# Patient Record
Sex: MALE | Race: WHITE | Employment: OTHER | URBAN - METROPOLITAN AREA
[De-identification: names, ages, dates, MRNs, and addresses within clinical notes are randomized per-mention and may not be internally consistent; named-entity substitution may affect disease eponyms.]

---

## 2022-01-18 NOTE — PROGRESS NOTES
Chief Complaint   Patient presents with    New Patient    Leg Pain       HPI:    Qian Olsen is a 73 y.o. male who present to [facility] care. He is a retired  who moved to the area about 7 months ago. HTN:  Per patient report, this has been elevated for \"years\" but he has never been treated. DM:  Previously on 3 different medications, but has been off of these for over a year. Sugars at home have been reading 280-300. HLD:  Severe episode of rhabdo after starting a statin medication; now controlled with diet. Bipolar d/o:  Treated at local [facility]; stable on Seroquel. New issues:  Concerned about the cost of medical care; stopped DM meds due to cost and inquires if his Seroquel can be managed here instead of at [facility]. Not on File    Current Outpatient Medications   Medication Sig    lisinopriL (PRINIVIL, ZESTRIL) 10 mg tablet Take 1 Tablet by mouth daily. No current facility-administered medications for this visit. Past Medical History:   Diagnosis Date    Diabetes ([facility] [address]        Family History   Problem Relation Age of Onset    Diabetes Father        Review of Systems   Constitutional: Negative. Negative for chills, fever and malaise/fatigue. HENT: Positive for hearing loss. Eyes: Negative. Respiratory: Negative. Negative for cough and shortness of breath. Cardiovascular: Negative. Negative for chest pain, palpitations and leg swelling. Gastrointestinal: Negative. Negative for abdominal pain, nausea and vomiting. Genitourinary: Negative. Musculoskeletal: Negative. Skin: Negative. Neurological: Negative. Negative for dizziness and headaches. Endo/Heme/Allergies: Negative. Psychiatric/Behavioral: Negative. Negative for depression. The patient is not nervous/anxious.          BP (!) 174/88   Pulse 80   Temp 97.5 °F (36.4 °C) (Temporal)   Resp 20   Ht 6' 3\" (1.905 m)   Wt 207 lb 6.4 oz (94.1 kg)   SpO2 97%   BMI 25.92 kg/m²     Wt Readings from Last 3 Encounters:   01/21/22 207 lb 6.4 oz (94.1 kg)       Physical Exam  Vitals and nursing note reviewed. Constitutional:       General: He is not in acute distress. Appearance: Normal appearance. HENT:      Head: Normocephalic and atraumatic. Mouth/Throat:      Mouth: Mucous membranes are moist.      Pharynx: Oropharynx is clear. Eyes:      Extraocular Movements: Extraocular movements intact. Conjunctiva/sclera: Conjunctivae normal.      Pupils: Pupils are equal, round, and reactive to light. Neck:      Vascular: No carotid bruit. Cardiovascular:      Rate and Rhythm: Normal rate and regular rhythm. Pulses: Normal pulses. Heart sounds: Normal heart sounds. No murmur heard. No friction rub. No gallop. Pulmonary:      Effort: Pulmonary effort is normal.      Breath sounds: Normal breath sounds. No wheezing, rhonchi or rales. Abdominal:      General: Bowel sounds are normal.      Palpations: Abdomen is soft. Musculoskeletal:         General: Normal range of motion. Cervical back: Normal range of motion and neck supple. Lymphadenopathy:      Cervical: No cervical adenopathy. Skin:     General: Skin is warm and dry. Neurological:      General: No focal deficit present. Mental Status: He is alert and oriented to person, place, and time. Psychiatric:         Mood and Affect: Mood normal.         Behavior: Behavior normal.         Thought Content: Thought content normal.         Judgment: Judgment normal.       ASSESSMENT AND PLAN:       ICD-10-CM ICD-9-CM    1. Medicare annual wellness visit, subsequent  Z00.00 V70.0    2. Primary hypertension  I10 401.9 COLLECTION VENOUS BLOOD,VENIPUNCTURE      LIPID PANEL      LIPID PANEL      lisinopriL (PRINIVIL, ZESTRIL) 10 mg tablet   3.  Type 2 diabetes mellitus with hyperglycemia, without long-term current use of insulin ([facility])  E11.65 250.00 COLLECTION VENOUS BLOOD,VENIPUNCTURE     790.29 CBC WITH AUTOMATED DIFF METABOLIC PANEL, COMPREHENSIVE      LIPID PANEL      LIPID PANEL      METABOLIC PANEL, COMPREHENSIVE      CBC WITH AUTOMATED DIFF   4. Vitamin D deficiency  E55.9 268.9 COLLECTION VENOUS BLOOD,VENIPUNCTURE      VITAMIN D, 25 HYDROXY      VITAMIN D, 25 HYDROXY   5. Screening for viral disease  Z11.59 V73.99 COLLECTION VENOUS BLOOD,VENIPUNCTURE      HEPATITIS C AB      HEPATITIS C AB   6. Prostate cancer screening  Z12.5 V76.44 COLLECTION VENOUS BLOOD,VENIPUNCTURE      PSA SCREENING (SCREENING)      PSA SCREENING (SCREENING)     Check labs today. Obtain records from previous PCP and mental health provider. Last colonoscopy was about 3 years ago and he is due again in March due to presence of polyp; will obtain these records. Is still considering COVID vaccine; thinks he'll probably get it. Declines flu, Shingrix, and PPSV vaccines. Based on today's BP reading and his report of previous elevation, will initiate lisinopril. Will await other labs to initiate DM treatment. Medication Side Effects and Warnings were discussed with patient:  yes  Patient Labs were reviewed:  yes  Patient Past Records were reviewed:  yes      Patient aware of plan of care and verbalized understanding. Questions answered. RTC 3 months or sooner if needed. On this date 01/21/2022 I have spent 45 minutes reviewing previous notes, test results and face to face with the patient discussing the diagnosis and importance of compliance with the treatment plan as well as documenting on the day of the visit.     Jose Luis Diaz NP

## 2022-01-21 ENCOUNTER — OFFICE VISIT (OUTPATIENT)
Dept: FAMILY MEDICINE CLINIC | Age: 72
End: 2022-01-21
Payer: MEDICARE

## 2022-01-21 ENCOUNTER — TELEPHONE (OUTPATIENT)
Dept: FAMILY MEDICINE CLINIC | Age: 72
End: 2022-01-21

## 2022-01-21 VITALS
WEIGHT: 207.4 LBS | BODY MASS INDEX: 25.79 KG/M2 | TEMPERATURE: 97.5 F | SYSTOLIC BLOOD PRESSURE: 174 MMHG | RESPIRATION RATE: 20 BRPM | HEART RATE: 80 BPM | HEIGHT: 75 IN | DIASTOLIC BLOOD PRESSURE: 88 MMHG | OXYGEN SATURATION: 97 %

## 2022-01-21 DIAGNOSIS — I10 PRIMARY HYPERTENSION: ICD-10-CM

## 2022-01-21 DIAGNOSIS — E55.9 VITAMIN D DEFICIENCY: ICD-10-CM

## 2022-01-21 DIAGNOSIS — Z00.00 MEDICARE ANNUAL WELLNESS VISIT, SUBSEQUENT: Primary | ICD-10-CM

## 2022-01-21 DIAGNOSIS — Z11.59 SCREENING FOR VIRAL DISEASE: ICD-10-CM

## 2022-01-21 DIAGNOSIS — E11.65 TYPE 2 DIABETES MELLITUS WITH HYPERGLYCEMIA, WITHOUT LONG-TERM CURRENT USE OF INSULIN (HCC): ICD-10-CM

## 2022-01-21 DIAGNOSIS — Z12.5 PROSTATE CANCER SCREENING: ICD-10-CM

## 2022-01-21 PROCEDURE — G0439 PPPS, SUBSEQ VISIT: HCPCS

## 2022-01-21 PROCEDURE — 99204 OFFICE O/P NEW MOD 45 MIN: CPT

## 2022-01-21 RX ORDER — LISINOPRIL 10 MG/1
10 TABLET ORAL DAILY
Qty: 30 TABLET | Refills: 2 | Status: SHIPPED | OUTPATIENT
Start: 2022-01-21 | End: 2022-04-22

## 2022-01-21 NOTE — ACP (ADVANCE CARE PLANNING)
Discussed importance of advanced medical directives with patient. Patient is capable of making decisions. Discussed advanced directives 1/21/2022. [place] advance directive form discussed with pt. Pt will consider options and give us written form back for inclusion in chart.

## 2022-01-21 NOTE — PATIENT INSTRUCTIONS
Medicare Wellness Visit, Male    The best way to live healthy is to have a lifestyle where you eat a well-balanced diet, exercise regularly, limit alcohol use, and quit all forms of tobacco/nicotine, if applicable. Regular preventive services are another way to keep healthy. Preventive services (vaccines, screening tests, monitoring & exams) can help personalize your care plan, which helps you manage your own care. Screening tests can find health problems at the earliest stages, when they are easiest to treat. [facility] follows the current, evidence-based guidelines published by the [facility] ([facility]) when recommending preventive services for our patients. Because we follow these guidelines, sometimes recommendations change over time as research supports it. (For example, a prostate screening blood test is no longer routinely recommended for men with no symptoms). Of course, you and your doctor may decide to screen more often for some diseases, based on your risk and co-morbidities (chronic disease you are already diagnosed with). Preventive services for you include:  - Medicare offers their members a free annual wellness visit, which is time for you and your primary care provider to discuss and plan for your preventive service needs. Take advantage of this benefit every year!  -All adults over age 72 should receive the recommended pneumonia vaccines. Current USPSTF guidelines recommend a series of two vaccines for the best pneumonia protection.   -All adults should have a flu vaccine yearly and tetanus vaccine every 10 years.  -All adults age 48 and older should receive the shingles vaccines (series of two vaccines).        -All adults age 38-68 who are overweight should have a diabetes screening test once every three years.   -Other screening tests & preventive services for persons with diabetes include: an eye exam to screen for diabetic retinopathy, a kidney function test, a foot exam, and stricter control over your cholesterol.   -Cardiovascular screening for adults with routine risk involves an electrocardiogram (ECG) at intervals determined by the provider.   -Colorectal cancer screening should be done for adults age 54-65 with no increased risk factors for colorectal cancer. There are a number of acceptable methods of screening for this type of cancer. Each test has its own benefits and drawbacks. Discuss with your provider what is most appropriate for you during your annual wellness visit. The different tests include: colonoscopy (considered the best screening method), a fecal occult blood test, a fecal DNA test, and sigmoidoscopy.  -All adults born between [facility] should be screened once for Hepatitis C.  -An Abdominal Aortic Aneurysm (AAA) Screening is recommended for men age 73-68 who has ever smoked in their lifetime. Here is a list of your current Health Maintenance items (your personalized list of preventive services) with a due date:  Health Maintenance Due   Topic Date Due    Hepatitis C Test  Never done    COVID-19 Vaccine (1) Never done    DTaP/Tdap/Td  (1 - Tdap) Never done    Cholesterol Test   Never done    Colorectal Screening  Never done    Shingles Vaccine (1 of 2) Never done    Pneumococcal Vaccine (1 of 1 - PPSV23) Never done    Yearly Flu Vaccine (1) Never done    Annual Well Visit  01/20/2022     Medicare Wellness Visit, Male    The best way to live healthy is to have a lifestyle where you eat a well-balanced diet, exercise regularly, limit alcohol use, and quit all forms of tobacco/nicotine, if applicable. Regular preventive services are another way to keep healthy. Preventive services (vaccines, screening tests, monitoring & exams) can help personalize your care plan, which helps you manage your own care.  Screening tests can find health problems at the earliest stages, when they are easiest to treatIker Villa follows the current, evidence-based guidelines published by the [facility] ([facility]) when recommending preventive services for our patients. Because we follow these guidelines, sometimes recommendations change over time as research supports it. (For example, a prostate screening blood test is no longer routinely recommended for men with no symptoms). Of course, you and your doctor may decide to screen more often for some diseases, based on your risk and co-morbidities (chronic disease you are already diagnosed with). Preventive services for you include:  - Medicare offers their members a free annual wellness visit, which is time for you and your primary care provider to discuss and plan for your preventive service needs. Take advantage of this benefit every year!  -All adults over age 72 should receive the recommended pneumonia vaccines. Current USPSTF guidelines recommend a series of two vaccines for the best pneumonia protection.   -All adults should have a flu vaccine yearly and tetanus vaccine every 10 years.  -All adults age 48 and older should receive the shingles vaccines (series of two vaccines). -All adults age 38-68 who are overweight should have a diabetes screening test once every three years.   -Other screening tests & preventive services for persons with diabetes include: an eye exam to screen for diabetic retinopathy, a kidney function test, a foot exam, and stricter control over your cholesterol.   -Cardiovascular screening for adults with routine risk involves an electrocardiogram (ECG) at intervals determined by the provider.   -Colorectal cancer screening should be done for adults age 54-65 with no increased risk factors for colorectal cancer. There are a number of acceptable methods of screening for this type of cancer. Each test has its own benefits and drawbacks.  Discuss with your provider what is most appropriate for you during your annual wellness visit. The different tests include: colonoscopy (considered the best screening method), a fecal occult blood test, a fecal DNA test, and sigmoidoscopy.  -All adults born between [facility] should be screened once for Hepatitis C.  -An Abdominal Aortic Aneurysm (AAA) Screening is recommended for men age 73-68 who has ever smoked in their lifetime. Here is a list of your current Health Maintenance items (your personalized list of preventive services) with a due date:  Health Maintenance Due   Topic Date Due    Hepatitis C Test  Never done    COVID-19 Vaccine (1) Never done    DTaP/Tdap/Td  (1 - Tdap) Never done    Cholesterol Test   Never done    Colorectal Screening  Never done    Shingles Vaccine (1 of 2) Never done    Pneumococcal Vaccine (1 of 1 - PPSV23) Never done    Yearly Flu Vaccine (1) Never done        Learning About Meal Planning for Diabetes  Why plan your meals? Meal planning can be a key part of managing diabetes. Planning meals and snacks with the right balance of carbohydrate, protein, and fat can help you keep your blood sugar at the target level you set with your doctor. You don't have to eat special foods. You can eat what your family eats, including sweets once in a while. But you do have to pay attention to how often you eat and how much you eat of certain foods. You may want to work with a dietitian or a certified diabetes educator. He or she can give you tips and meal ideas and can answer your questions about meal planning. This health professional can also help you reach a healthy weight if that is one of your goals. What plan is right for you? Your dietitian or diabetes educator may suggest that you start with the plate format or carbohydrate counting. The plate format  The plate format is a simple way to help you manage how you eat. You plan meals by learning how much space each food should take on a plate.  Using the plate format helps you spread carbohydrate throughout the day. It can make it easier to keep your blood sugar level within your target range. It also helps you see if you're eating healthy portion sizes. To use the plate format, you put non-starchy vegetables on half your plate. Add meat or meat substitutes on one-quarter of the plate. Put a grain or starchy vegetable (such as brown rice or a potato) on the final quarter of the plate. You can add a small piece of fruit and some low-fat or fat-free milk or yogurt, depending on your carbohydrate goal for each meal.  Here are some tips for using the plate format:  · Make sure that you are not using an oversized plate. A 9-inch plate is best. Many restaurants use larger plates. · Get used to using the plate format at home. Then you can use it when you eat out. · Write down your questions about using the plate format. Talk to your doctor, a dietitian, or a diabetes educator about your concerns. Carbohydrate counting  With carbohydrate counting, you plan meals based on the amount of carbohydrate in each food. Carbohydrate raises blood sugar higher and more quickly than any other nutrient. It is found in desserts, breads and cereals, and fruit. It's also found in starchy vegetables such as potatoes and corn, grains such as rice and pasta, and milk and yogurt. Spreading carbohydrate throughout the day helps keep your blood sugar levels within your target range. Your daily amount depends on several things, including your weight, how active you are, which diabetes medicines you take, and what your goals are for your blood sugar levels. A registered dietitian or diabetes educator can help you plan how much carbohydrate to include in each meal and snack. A guideline for your daily amount of carbohydrate is:  · 45 to 60 grams at each meal. That's about the same as 3 to 4 carbohydrate servings. · 15 to 20 grams at each snack.  That's about the same as 1 carbohydrate serving. The Nutrition Facts label on packaged foods tells you how much carbohydrate is in a serving of the food. First, look at the serving size on the food label. Is that the amount you eat in a serving? All of the nutrition information on a food label is based on that serving size. So if you eat more or less than that, you'll need to adjust the other numbers. Total carbohydrate is the next thing you need to look for on the label. If you count carbohydrate servings, one serving of carbohydrate is 15 grams. For foods that don't come with labels, such as fresh fruits and vegetables, you'll need a guide that lists carbohydrate in these foods. Ask your doctor, dietitian, or diabetes educator about books or other nutrition guides you can use. If you take insulin, you need to know how many grams of carbohydrate are in a meal. This lets you know how much rapid-acting insulin to take before you eat. If you use an insulin pump, you get a constant rate of insulin during the day. So the pump must be programmed at meals to give you extra insulin to cover the rise in blood sugar after meals. When you know how much carbohydrate you will eat, you can take the right amount of insulin. Or, if you always use the same amount of insulin, you need to make sure that you eat the same amount of carbohydrate at meals. If you need more help to understand carbohydrate counting and food labels, ask your doctor, dietitian, or diabetes educator. How can you plan healthy meals? Here are some tips to get started:  · Plan your meals a week at a time. Don't forget to include snacks too. · Use cookbooks or online recipes to plan several main meals. Plan some quick meals for busy nights. You also can double some recipes that freeze well. Then you can save half for other busy nights when you don't have time to cook. · Make sure you have the ingredients you need for your recipes.  If you're running low on basic items, put these items on your shopping list too. · List foods that you use to make breakfasts, lunches, and snacks. List plenty of fruits and vegetables. · Post this list on the refrigerator. Add to it as you think of more things you need. · Take the list to the store to do your weekly shopping. Follow-up care is a key part of your treatment and safety. Be sure to make and go to all appointments, and call your doctor if you are having problems. It's also a good idea to know your test results and keep a list of the medicines you take. Where can you learn more? Go to http://www.gray.com/  Enter V847 in the search box to learn more about \"Learning About Meal Planning for Diabetes. \"  Current as of: December 17, 2020               Content Version: 13.0  © 2619-3418 [employer]. Care instructions adapted under license by [employer] (which disclaims liability or warranty for this information). If you have questions about a medical condition or this instruction, always ask your healthcare professional. [place] [address] any warranty or liability for your use of this information.

## 2022-01-21 NOTE — PROGRESS NOTES
Cielo Shaver is a 73 y.o. male presenting for/with:    Chief Complaint   Patient presents with    New Patient    Leg Pain       Visit Vitals  BP (!) 174/88   Pulse 80   Temp 97.5 °F (36.4 °C) (Temporal)   Resp 20   Ht 6' 3\" (1.905 m)   Wt 207 lb 6.4 oz (94.1 kg)   SpO2 97%   BMI 25.92 kg/m²     Pain Scale: 0 - No pain/10  Pain Location:     1. Have you been to the ER, urgent care clinic since your last visit? Hospitalized since your last visit? NO    2. Have you seen or consulted any other health care providers outside of the [address] [place] [address] since your last visit? Include any pap smears or colon screening. NO    Symptom review:  NO  Fever   NO  Shaking chills  NO  Cough  NO  Body aches  NO  Coughing up blood  NO  Chest congestion  NO  Chest pain  NO  Shortness of breath  NO  Profound Loss of smell/taste  NO  Nausea/Vomiting   NO  Loose stool/Diarrhea  NO  any skin issues    Patient Risk Factors Reviewed as follows:  NO  have you been in Close contact with confirmed COVID19 patient   NO  History of recent travel to affected geographical areas within the past 14 days  NO  COPD  NO  Active Cancer/Leukemia/Lymphoma/Chemotherapy  NO  Oral steroid use  NO  Pregnant  YES  Diabetes Mellitus  NO  Heart disease  NO  Asthma  NO Health care worker at home  NO Health care worker  NO Is there a Pregnant Woman in the home  NO Dialysis pt in the home   NO a large number of people living in the home    No flowsheet data found. Fall Risk Assessment, last 12 mths 1/21/2022   Able to walk? Yes   Fall in past 12 months? 0   Do you feel unsteady? 0   Are you worried about falling 0       3 most recent PHQ Screens 1/21/2022   Little interest or pleasure in doing things Not at all   Feeling down, depressed, irritable, or hopeless Not at all   Total Score PHQ 2 0     Abuse Screening Questionnaire 1/21/2022   Do you ever feel afraid of your partner?  N   Are you in a relationship with someone who physically or mentally threatens you? N   Is it safe for you to go home? Y       ADL Assessment 1/21/2022   Feeding yourself No Help Needed   Getting from bed to chair No Help Needed   Getting dressed No Help Needed   Bathing or showering No Help Needed   Walk across the room (includes cane/walker) No Help Needed   Using the telphone No Help Needed   Taking your medications No Help Needed   Preparing meals No Help Needed   Managing money (expenses/bills) No Help Needed   Moderately strenuous housework (laundry) No Help Needed   Shopping for personal items (toiletries/medicines) No Help Needed   Shopping for groceries No Help Needed   Driving No Help Needed   Climbing a flight of stairs No Help Needed   Getting to places beyond walking distances No Help Needed      No advanced directives on file. Verified emergency contact. This is the Subsequent Medicare Annual Wellness Exam, performed 12 months or more after the Initial AWV or the last Subsequent AWV    I have reviewed the patient's medical history in detail and updated the computerized patient record. Assessment/Plan   Education and counseling provided:  Are appropriate based on today's review and evaluation    1. Vitamin D deficiency  2. Medicare annual wellness visit, subsequent  3. Type 2 diabetes mellitus with hyperglycemia, without long-term current use of insulin ([facility]  4. Primary hypertension  5. Screening for viral disease  6. Prostate cancer screening       Depression Risk Factor Screening     3 most recent PHQ Screens 1/21/2022   Little interest or pleasure in doing things Not at all   Feeling down, depressed, irritable, or hopeless Not at all   Total Score PHQ 2 0       Alcohol & Drug Abuse Risk Screen    Do you average more than 1 drink per night or more than 7 drinks a week: No    In the past three months have you have had more than 4 drinks containing alcohol on one occasion: No          Functional Ability and Level of Safety    Hearing: Hearing is impaired. Activities of Daily Living: The home contains: no safety equipment. Patient does total self care      Ambulation: with no difficulty     Fall Risk:  Fall Risk Assessment, last 12 mths 1/21/2022   Able to walk? Yes   Fall in past 12 months? 0   Do you feel unsteady? 0   Are you worried about falling 0      Abuse Screen:  Patient is not abused       Cognitive Screening    Has your family/caregiver stated any concerns about your memory: no           Health Maintenance Due     Health Maintenance Due   Topic Date Due    Hepatitis C Screening  Never done    COVID-19 Vaccine (1) Never done    DTaP/Tdap/Td series (1 - Tdap) Never done    Lipid Screen  Never done    Colorectal Cancer Screening Combo  Never done    Shingrix Vaccine Age 50> (1 of 2) Never done    Pneumococcal 65+ years (1 of 1 - PPSV23) Never done    Flu Vaccine (1) Never done       Patient Care Team   Patient Care Team:  Breezy Hatch NP as PCP - General (Nurse Practitioner)    History     Patient Active Problem List   Diagnosis Code    Diabetes ([facility] E11.9     Past Medical History:   Diagnosis Date    Diabetes ([facility]       History reviewed. No pertinent surgical history.     Not on File    Family History   Problem Relation Age of Onset    Diabetes Father      Social History     Tobacco Use    Smoking status: Former Smoker     Types: Cigarettes    Smokeless tobacco: Never Used   Substance Use Topics    Alcohol use: Not Currently         Shmuel Linda

## 2022-01-22 LAB
25(OH)D3 SERPL-MCNC: 11.5 NG/ML (ref 30–100)
ALBUMIN SERPL-MCNC: 4 G/DL (ref 3.5–5)
ALBUMIN/GLOB SERPL: 1.1 {RATIO} (ref 1.1–2.2)
ALP SERPL-CCNC: 92 U/L (ref 45–117)
ALT SERPL-CCNC: 40 U/L (ref 12–78)
ANION GAP SERPL CALC-SCNC: 8 MMOL/L (ref 5–15)
AST SERPL-CCNC: 21 U/L (ref 15–37)
BASOPHILS # BLD: 0 K/UL (ref 0–0.1)
BASOPHILS NFR BLD: 0 % (ref 0–1)
BILIRUB SERPL-MCNC: 0.4 MG/DL (ref 0.2–1)
BUN SERPL-MCNC: 19 MG/DL (ref 6–20)
BUN/CREAT SERPL: 15 (ref 12–20)
CALCIUM SERPL-MCNC: 9.4 MG/DL (ref 8.5–10.1)
CHLORIDE SERPL-SCNC: 104 MMOL/L (ref 97–108)
CHOLEST SERPL-MCNC: 248 MG/DL
CO2 SERPL-SCNC: 21 MMOL/L (ref 21–32)
CREAT SERPL-MCNC: 1.25 MG/DL (ref 0.7–1.3)
DIFFERENTIAL METHOD BLD: ABNORMAL
EOSINOPHIL # BLD: 0 K/UL (ref 0–0.4)
EOSINOPHIL NFR BLD: 1 % (ref 0–7)
ERYTHROCYTE [DISTWIDTH] IN BLOOD BY AUTOMATED COUNT: 12.3 % (ref 11.5–14.5)
GLOBULIN SER CALC-MCNC: 3.5 G/DL (ref 2–4)
GLUCOSE SERPL-MCNC: 320 MG/DL (ref 65–100)
HCT VFR BLD AUTO: 50.2 % (ref 36.6–50.3)
HCV AB SERPL QL IA: NONREACTIVE
HDLC SERPL-MCNC: 31 MG/DL
HDLC SERPL: 8 {RATIO} (ref 0–5)
HGB BLD-MCNC: 16.8 G/DL (ref 12.1–17)
IMM GRANULOCYTES # BLD AUTO: 0 K/UL (ref 0–0.04)
IMM GRANULOCYTES NFR BLD AUTO: 0 % (ref 0–0.5)
LDLC SERPL CALC-MCNC: ABNORMAL MG/DL (ref 0–100)
LDLC SERPL DIRECT ASSAY-MCNC: 149 MG/DL (ref 0–100)
LYMPHOCYTES # BLD: 1.8 K/UL (ref 0.8–3.5)
LYMPHOCYTES NFR BLD: 26 % (ref 12–49)
MCH RBC QN AUTO: 29.1 PG (ref 26–34)
MCHC RBC AUTO-ENTMCNC: 33.5 G/DL (ref 30–36.5)
MCV RBC AUTO: 87 FL (ref 80–99)
MONOCYTES # BLD: 0.3 K/UL (ref 0–1)
MONOCYTES NFR BLD: 4 % (ref 5–13)
NEUTS SEG # BLD: 4.8 K/UL (ref 1.8–8)
NEUTS SEG NFR BLD: 69 % (ref 32–75)
NRBC # BLD: 0 K/UL (ref 0–0.01)
NRBC BLD-RTO: 0 PER 100 WBC
PLATELET # BLD AUTO: 197 K/UL (ref 150–400)
PMV BLD AUTO: 11.5 FL (ref 8.9–12.9)
POTASSIUM SERPL-SCNC: 4.7 MMOL/L (ref 3.5–5.1)
PROT SERPL-MCNC: 7.5 G/DL (ref 6.4–8.2)
PSA SERPL-MCNC: 3 NG/ML (ref 0.01–4)
RBC # BLD AUTO: 5.77 M/UL (ref 4.1–5.7)
SODIUM SERPL-SCNC: 133 MMOL/L (ref 136–145)
TRIGL SERPL-MCNC: 480 MG/DL (ref ?–150)
VLDLC SERPL CALC-MCNC: ABNORMAL MG/DL
WBC # BLD AUTO: 7 K/UL (ref 4.1–11.1)

## 2022-01-24 ENCOUNTER — TELEPHONE (OUTPATIENT)
Dept: FAMILY MEDICINE CLINIC | Age: 72
End: 2022-01-24

## 2022-01-24 DIAGNOSIS — E55.9 VITAMIN D DEFICIENCY: ICD-10-CM

## 2022-01-24 DIAGNOSIS — E11.65 TYPE 2 DIABETES MELLITUS WITH HYPERGLYCEMIA, WITHOUT LONG-TERM CURRENT USE OF INSULIN (HCC): Primary | ICD-10-CM

## 2022-01-24 LAB
EST. AVERAGE GLUCOSE BLD GHB EST-MCNC: 255 MG/DL
HBA1C MFR BLD: 10.5 % (ref 4–5.6)

## 2022-01-24 RX ORDER — ERGOCALCIFEROL 1.25 MG/1
50000 CAPSULE ORAL
Qty: 13 CAPSULE | Refills: 0 | Status: SHIPPED | OUTPATIENT
Start: 2022-01-24 | End: 2022-10-28

## 2022-01-24 RX ORDER — METFORMIN HYDROCHLORIDE 500 MG/1
500 TABLET ORAL 2 TIMES DAILY WITH MEALS
Qty: 180 TABLET | Refills: 0 | Status: SHIPPED | OUTPATIENT
Start: 2022-01-24 | End: 2022-03-03 | Stop reason: SDUPTHER

## 2022-01-24 NOTE — PROGRESS NOTES
Hepatitis C, prostate, and thyroid screening tests were all normal.  Your kidney and liver tests were normal, as are your blood counts. Your triglycerides and cholesterol are very high; please work on following a heart healthy diet, eat whole grains, lean meats, and plenty of fruits and veggies--avoid concentrated sweets and saturated fats. Your vitamin D level is very low; I will call in a prescription vitamin D supplement that you take once a week. Your A1C was very high at 10.5; we will get you started back on your metformin and recheck in 3 months.

## 2022-01-25 NOTE — PROGRESS NOTES
Pt has 3 months of Seroquel 300mg. Take 2 pills in the evening. He said that you stated you will fill these for him in the future. He just wanted to make sure so that he can tell the mother provider to stop prescribing.

## 2022-01-26 NOTE — PROGRESS NOTES
QUITA- Pt did talk to his [place] [id number] provider, he is okay with that. Pt will still continue to go to them for \"talk therapy\"     He will call office when he is in need of refill.

## 2022-02-07 ENCOUNTER — TELEPHONE (OUTPATIENT)
Dept: FAMILY MEDICINE CLINIC | Age: 72
End: 2022-02-07

## 2022-02-07 NOTE — TELEPHONE ENCOUNTER
Wants to talk to martin about blood sugars the metformin she put him on is not changing his numbers he wants  to increase to 1000 mg which is what he said he told her he was on before and worked

## 2022-03-03 DIAGNOSIS — E11.65 TYPE 2 DIABETES MELLITUS WITH HYPERGLYCEMIA, WITHOUT LONG-TERM CURRENT USE OF INSULIN (HCC): ICD-10-CM

## 2022-03-03 RX ORDER — METFORMIN HYDROCHLORIDE 500 MG/1
500 TABLET ORAL 2 TIMES DAILY WITH MEALS
Qty: 180 TABLET | Refills: 0 | Status: SHIPPED | OUTPATIENT
Start: 2022-03-03 | End: 2022-03-09

## 2022-03-03 NOTE — TELEPHONE ENCOUNTER
The patient is looking to have a 3 mo supply of Metformin 1,000 mg to be sent to [facility], if any questions his phone # is [phone]

## 2022-03-09 ENCOUNTER — TELEPHONE (OUTPATIENT)
Dept: FAMILY MEDICINE CLINIC | Age: 72
End: 2022-03-09

## 2022-03-09 DIAGNOSIS — E11.65 TYPE 2 DIABETES MELLITUS WITH HYPERGLYCEMIA, WITHOUT LONG-TERM CURRENT USE OF INSULIN (HCC): ICD-10-CM

## 2022-03-09 RX ORDER — METFORMIN HYDROCHLORIDE 1000 MG/1
1000 TABLET ORAL 2 TIMES DAILY WITH MEALS
Qty: 180 TABLET | Refills: 3 | Status: SHIPPED | OUTPATIENT
Start: 2022-03-09

## 2022-03-09 NOTE — TELEPHONE ENCOUNTER
----- Message from Alison Gaitan sent at 3/9/2022 10:56 AM EST -----  Subject: Medication Problem    QUESTIONS  Name of Medication? metFORMIN (GLUCOPHAGE) 500 mg tablet  Patient-reported dosage and instructions? 500 mg   What question or problem do you have with the medication? Patient states   that he was supposed to confirm if Dr. Sebastián Monroe could prescribe 1000 MG   instead of 500 MG and no one has gotten back with patient. Please call   patient as soon as possible due to needing medication. Preferred Pharmacy? [facility]   [address] [place] [address] phone number (if available)? [phone]  Additional Information for Provider?   ---------------------------------------------------------------------------  --------------  [address]  What is the best way for the office to contact you? Do not leave any   message, patient will call back for answer  Preferred Call Back Phone Number? [phone]  ---------------------------------------------------------------------------  --------------  SCRIPT ANSWERS  Relationship to Patient?  Self

## 2022-03-09 NOTE — TELEPHONE ENCOUNTER
The patient is calling stating that he should be now be on 1,000 mg of Metformin but he needs an updated prescription to be sent to [facility] in [address], his phone # is [phone] if there are any questions

## 2022-03-09 NOTE — TELEPHONE ENCOUNTER
----- Message from Nina Teresa sent at 3/9/2022 10:27 AM EST -----  Subject: Message to Provider    QUESTIONS  Information for Provider? patient had called in early and spoke to a nurse   about his medications. He needs to speak to the office again regarding   this.   ---------------------------------------------------------------------------  --------------  [address]  What is the best way for the office to contact you? OK to leave message on   voicemail  Preferred Call Back Phone Number?  [phone]  ---------------------------------------------------------------------------  --------------  SCRIPT ANSWERS  undefined

## 2022-04-22 ENCOUNTER — OFFICE VISIT (OUTPATIENT)
Dept: FAMILY MEDICINE CLINIC | Age: 72
End: 2022-04-22
Payer: MEDICARE

## 2022-04-22 VITALS
BODY MASS INDEX: 26.64 KG/M2 | TEMPERATURE: 97.5 F | DIASTOLIC BLOOD PRESSURE: 80 MMHG | WEIGHT: 213.13 LBS | OXYGEN SATURATION: 98 % | SYSTOLIC BLOOD PRESSURE: 140 MMHG | RESPIRATION RATE: 18 BRPM | HEART RATE: 95 BPM

## 2022-04-22 DIAGNOSIS — F31.70 MIXED BIPOLAR I DISORDER IN REMISSION (HCC): ICD-10-CM

## 2022-04-22 DIAGNOSIS — E55.9 VITAMIN D DEFICIENCY: ICD-10-CM

## 2022-04-22 DIAGNOSIS — E11.65 TYPE 2 DIABETES MELLITUS WITH HYPERGLYCEMIA, WITHOUT LONG-TERM CURRENT USE OF INSULIN (HCC): Primary | ICD-10-CM

## 2022-04-22 DIAGNOSIS — I10 PRIMARY HYPERTENSION: ICD-10-CM

## 2022-04-22 PROCEDURE — 99214 OFFICE O/P EST MOD 30 MIN: CPT

## 2022-04-22 RX ORDER — QUETIAPINE FUMARATE 300 MG/1
600 TABLET, FILM COATED ORAL
COMMUNITY
Start: 2022-04-12 | End: 2022-04-22 | Stop reason: SDUPTHER

## 2022-04-22 RX ORDER — QUETIAPINE FUMARATE 300 MG/1
600 TABLET, FILM COATED ORAL
Qty: 180 TABLET | Refills: 3 | Status: SHIPPED | OUTPATIENT
Start: 2022-04-22

## 2022-04-22 RX ORDER — LISINOPRIL 5 MG/1
5 TABLET ORAL DAILY
Qty: 90 TABLET | Refills: 3 | Status: SHIPPED | OUTPATIENT
Start: 2022-04-22 | End: 2022-07-27 | Stop reason: SINTOL

## 2022-04-22 NOTE — PROGRESS NOTES
Chief Complaint   Patient presents with    Diabetes       HPI:    Kathia Montague is a 73 y.o. male who presents for a 3-month diabetes f/u. T2DM:  Diagnosed years ago; restarted on metformin in January and has been doing well. Notes morning sugars are usually about 180. Admits that he doesn't follow his diabetic diet very closely. HTN:  Started on lisinopril in January; he took this for 2-3 weeks, but then stopped due to dizziness. Bipolar:  Was following with [facility], but stopped about 2 month ago; feels like he is managing well with Seroquel, doesn't need therapy. No episodes of depression, vinny, SI for 5-6 years. HLD:  Adamant against statins, reports episode of renal and liver failure after starting medication several years ago. Vitamin D deficiency:  Notes that the vitamin D supplement seems to be helping with muscle cramps; inquires about continuing this medication. No Known Allergies    Current Outpatient Medications   Medication Sig    QUEtiapine (SEROquel) 300 mg tablet Take 2 Tablets by mouth nightly.  lisinopriL (PRINIVIL, ZESTRIL) 5 mg tablet Take 1 Tablet by mouth daily.  metFORMIN (GLUCOPHAGE) 1,000 mg tablet Take 1 Tablet by mouth two (2) times daily (with meals).  ergocalciferol (ERGOCALCIFEROL) 1,250 mcg (50,000 unit) capsule Take 1 Capsule by mouth every seven (7) days. No current facility-administered medications for this visit. Past Medical History:   Diagnosis Date    [facility]        Family History   Problem Relation Age of Onset    Diabetes Father          Review of Systems   Constitutional: Negative. Negative for chills, fever and malaise/fatigue. Respiratory: Negative. Negative for cough and shortness of breath. Cardiovascular: Negative. Negative for chest pain, palpitations and leg swelling. Gastrointestinal: Negative. Negative for abdominal pain, nausea and vomiting. Genitourinary: Negative. Musculoskeletal: Negative. Skin: Negative. Neurological: Negative. Negative for dizziness and headaches. Endo/Heme/Allergies: Negative. Psychiatric/Behavioral: Negative. Negative for depression. The patient is not nervous/anxious. BP (!) 140/80 (BP 1 Location: Left arm)   Pulse 95   Temp 97.5 °F (36.4 °C) (Temporal)   Resp 18   Wt 213 lb 2 oz (96.7 kg)   SpO2 98%   BMI 26.64 kg/m²     Wt Readings from Last 3 Encounters:   04/22/22 213 lb 2 oz (96.7 kg)   01/21/22 207 lb 6.4 oz (94.1 kg)       3 most recent PHQ Screens 4/22/2022   Little interest or pleasure in doing things Not at all   Feeling down, depressed, irritable, or hopeless Not at all   Total Score PHQ 2 0       Physical Exam  Constitutional:       General: He is not in acute distress. Appearance: Normal appearance. HENT:      Head: Normocephalic and atraumatic. Eyes:      Extraocular Movements: Extraocular movements intact. Conjunctiva/sclera: Conjunctivae normal.      Pupils: Pupils are equal, round, and reactive to light. Cardiovascular:      Rate and Rhythm: Normal rate and regular rhythm. Pulses:           Dorsalis pedis pulses are 2+ on the right side and 2+ on the left side. Posterior tibial pulses are 1+ on the right side and 1+ on the left side. Heart sounds: Normal heart sounds. No murmur heard. No friction rub. No gallop. Pulmonary:      Effort: Pulmonary effort is normal.      Breath sounds: Normal breath sounds. No wheezing or rhonchi. Abdominal:      General: Bowel sounds are normal.      Palpations: Abdomen is soft. Musculoskeletal:         General: Normal range of motion. Right foot: No deformity. Left foot: No deformity. Feet:      Right foot:      Protective Sensation: 6 sites tested. 6 sites sensed. Skin integrity: Skin integrity normal.      Left foot:      Protective Sensation: 6 sites tested. 6 sites sensed.       Skin integrity: Skin integrity normal.   Skin:     General: Skin is warm and dry. Neurological:      General: No focal deficit present. Mental Status: He is alert and oriented to person, place, and time. Mental status is at baseline. Psychiatric:         Mood and Affect: Mood normal.         Behavior: Behavior normal.         Thought Content: Thought content normal.         Judgment: Judgment normal.       ASSESSMENT AND PLAN:       ICD-10-CM ICD-9-CM    1. Type 2 diabetes mellitus with hyperglycemia, without long-term current use of insulin ([facility])  E11.65 250.00 MICROALBUMIN, UR, RAND W/ MICROALB/CREAT RATIO     790.29 HM DIABETES FOOT EXAM      COLLECTION VENOUS BLOOD,VENIPUNCTURE      HEMOGLOBIN A1C WITH EAG      lisinopriL (PRINIVIL, ZESTRIL) 5 mg tablet   2. Primary hypertension  I10 401.9 lisinopriL (PRINIVIL, ZESTRIL) 5 mg tablet   3. Vitamin D deficiency  E55.9 268.9 COLLECTION VENOUS BLOOD,VENIPUNCTURE      VITAMIN D, 25 HYDROXY   4. Mixed bipolar I disorder in remission ([facility])  F31.70 296.66 QUEtiapine (SEROquel) 300 mg tablet       Check labs today. Discussed need for ACE-I therapy for renal protection; reduce dose and switch to bedtime dosing to reduce SEs. Discussed statin therapy; he continues to declines. Recommend he work hard on improving diet, decreased concentrated sweets, increase Omega-3 fatty acid consumption, increase fiber. Reevaluate vitamin D levels prior to continuing supplementation. Continue current dose of Seroquel. Medication Side Effects and Warnings were discussed with patient:  yes  Patient Labs were reviewed:  yes  Patient Past Records were reviewed:  yes      Patient aware of plan of care and verbalized understanding. Questions answered. RTC 3 months or sooner if needed. On this date 04/22/2022 I have spent 30 minutes reviewing previous notes, test results and face to face with the patient discussing the diagnosis and importance of compliance with the treatment plan as well as documenting on the day of the visit.     Jodi JUAREZ Katie Abdullahi, NP

## 2022-04-22 NOTE — PROGRESS NOTES
Chief Complaint   Patient presents with    Diabetes     1. Have you been to the ER, urgent care clinic since your last visit? Hospitalized since your last visit? No    2. Have you seen or consulted any other health care providers outside of the [address] [place] [address] since your last visit? Include any pap smears or colon screening.  No    Visit Vitals  BP (!) 140/80 (BP 1 Location: Left arm)   Pulse 95   Temp 97.5 °F (36.4 °C) (Temporal)   Resp 18   Wt 213 lb 2 oz (96.7 kg)   SpO2 98%   BMI 26.64 kg/m²

## 2022-04-22 NOTE — PATIENT INSTRUCTIONS
Learning About Meal Planning for Diabetes  Why plan your meals? Meal planning can be a key part of managing diabetes. Planning meals and snacks with the right balance of carbohydrate, protein, and fat can help you keep your blood sugar at the target level you set with your doctor. You don't have to eat special foods. You can eat what your family eats, including sweets once in a while. But you do have to pay attention to how often you eat and how much you eat of certain foods. You may want to work with a dietitian or a diabetes educator. They can give you tips and meal ideas and can answer your questions about meal planning. This health professional can also help you reach a healthy weight if that is one of your goals. What plan is right for you? Your dietitian or diabetes educator may suggest that you start with the plate format or carbohydrate counting. The plate format  The plate format is a simple way to help you manage how you eat. You plan meals by learning how much space each food should take on a plate. Using the plate format helps you manage the amount of carbohydrate you eat. It can make it easier to keep your blood sugar level within your target range. It also helps you see if you're eating healthy portion sizes. To use the plate format, you put non-starchy vegetables on half your plate. Add lean protein foods, such as fish, lean meats and poultry, or soy products, on one-quarter of the plate. Put a grain or starchy vegetable (such as brown rice or a potato) on the final quarter of the plate. You can add a small piece of fruit and some low-fat or fat-free milk or yogurt, depending on your carbohydrate goal for each meal.  Here are some tips for using the plate format:  · Make sure that you are not using an oversized plate. A 9-inch plate is best. Many restaurants use larger plates. · Get used to using the plate format at home. Then you can use it when you eat out.   · Write down your questions about using the plate format. Talk to your doctor, a dietitian, or a diabetes educator about your concerns. Carbohydrate counting  With carbohydrate counting, you plan meals based on the amount of carbohydrate in each food. Carbohydrate raises blood sugar higher and more quickly than any other nutrient. It is found in desserts, breads and cereals, and fruit. It's also found in starchy vegetables such as potatoes and corn, grains such as rice and pasta, and milk and yogurt. You can help keep your blood sugar levels within your target range by planning how much carbohydrate to have at meals and snacks. The amount you need depends on several things. These include your weight, how active you are, which diabetes medicines you take, and what your goals are for your blood sugar levels. A registered dietitian or diabetes educator can help you plan how much carbohydrate to include in each meal and snack. An example of a carbohydrate counting plan is:  · 45 to 60 grams at each meal. That's about the same as 3 to 4 carbohydrate servings. · 15 to 20 grams at each snack. That's about the same as 1 carbohydrate serving. The Nutrition Facts label on packaged foods tells you how much carbohydrate is in a serving of the food. First, look at the serving size on the food label. Is that the amount you eat in a serving? All of the nutrition information on a food label is based on that serving size. So if you eat more or less than that, you'll need to adjust the other numbers. Total carbohydrate is the next thing you need to look for on the label. If you count carbohydrate servings, one serving of carbohydrate is 15 grams. For foods that don't come with labels, such as fresh fruits and vegetables, you'll need a guide that lists carbohydrate in these foods. Ask your doctor, dietitian, or diabetes educator about books or other nutrition guides you can use.   If you take insulin, you need to know how many grams of carbohydrate are in a meal. This lets you know how much rapid-acting insulin to take before you eat. If you use an insulin pump, you get a constant rate of insulin during the day. So the pump must be programmed at meals to give you extra insulin to cover the rise in blood sugar after meals. When you know how much carbohydrate you will eat, you can take the right amount of insulin. Or, if you always use the same amount of insulin, you need to make sure that you eat the same amount of carbohydrate at meals. If you need more help to understand carbohydrate counting and food labels, ask your doctor, dietitian, or diabetes educator. How can you plan healthy meals? Here are some tips to get started:  · Plan your meals a week at a time. Don't forget to include snacks too. · Use cookbooks or online recipes to plan several main meals. Plan some quick meals for busy nights. You also can double some recipes that freeze well. Then you can save half for other busy nights when you don't have time to cook. · Make sure you have the ingredients you need for your recipes. If you're running low on basic items, put these items on your shopping list too. · List foods that you use to make breakfasts, lunches, and snacks. List plenty of fruits and vegetables. · Post this list on the refrigerator. Add to it as you think of more things you need. · Take the list to the store to do your weekly shopping. Follow-up care is a key part of your treatment and safety. Be sure to make and go to all appointments, and call your doctor if you are having problems. It's also a good idea to know your test results and keep a list of the medicines you take. Where can you learn more? Go to http://www.gray.com/  Enter Z731 in the search box to learn more about \"Learning About Meal Planning for Diabetes. \"  Current as of: September 8, 2021               Content Version: 13.2  © 8900-9248 Healthwise, [facility].    Care instructions adapted under license by [employer] (which disclaims liability or warranty for this information). If you have questions about a medical condition or this instruction, always ask your healthcare professional. [address] any warranty or liability for your use of this information. DASH Diet: Care Instructions  Your Care Instructions     The DASH diet is an eating plan that can help lower your blood pressure. DASH stands for Dietary Approaches to Stop Hypertension. Hypertension is high blood pressure. The DASH diet focuses on eating foods that are high in calcium, potassium, and magnesium. These nutrients can lower blood pressure. The foods that are highest in these nutrients are fruits, vegetables, low-fat dairy products, nuts, seeds, and legumes. But taking calcium, potassium, and magnesium supplements instead of eating foods that are high in those nutrients does not have the same effect. The DASH diet also includes whole grains, fish, and poultry. The DASH diet is one of several lifestyle changes your doctor may recommend to lower your high blood pressure. Your doctor may also want you to decrease the amount of sodium in your diet. Lowering sodium while following the DASH diet can lower blood pressure even further than just the DASH diet alone. Follow-up care is a key part of your treatment and safety. Be sure to make and go to all appointments, and call your doctor if you are having problems. It's also a good idea to know your test results and keep a list of the medicines you take. How can you care for yourself at home? Following the DASH diet  · Eat 4 to 5 servings of fruit each day. A serving is 1 medium-sized piece of fruit, ½ cup chopped or canned fruit, 1/4 cup dried fruit, or 4 ounces (½ cup) of fruit juice. Choose fruit more often than fruit juice. · Eat 4 to 5 servings of vegetables each day.  A serving is 1 cup of lettuce or raw leafy vegetables, ½ cup of chopped or cooked vegetables, or 4 ounces (½ cup) of vegetable juice. Choose vegetables more often than vegetable juice. · Get 2 to 3 servings of low-fat and fat-free dairy each day. A serving is 8 ounces of milk, 1 cup of yogurt, or 1 ½ ounces of cheese. · Eat 6 to 8 servings of grains each day. A serving is 1 slice of bread, 1 ounce of dry cereal, or ½ cup of cooked rice, pasta, or cooked cereal. Try to choose whole-grain products as much as possible. · Limit lean meat, poultry, and fish to 2 servings each day. A serving is 3 ounces, about the size of a deck of cards. · Eat 4 to 5 servings of nuts, seeds, and legumes (cooked dried beans, lentils, and split peas) each week. A serving is 1/3 cup of nuts, 2 tablespoons of seeds, or ½ cup of cooked beans or peas. · Limit fats and oils to 2 to 3 servings each day. A serving is 1 teaspoon of vegetable oil or 2 tablespoons of salad dressing. · Limit sweets and added sugars to 5 servings or less a week. A serving is 1 tablespoon jelly or jam, ½ cup sorbet, or 1 cup of lemonade. · Eat less than 2,300 milligrams (mg) of sodium a day. If you limit your sodium to 1,500 mg a day, you can lower your blood pressure even more. · Be aware that all of these are the suggested number of servings for people who eat 1,800 to 2,000 calories a day. Your recommended number of servings may be different if you need more or fewer calories. Tips for success  · Start small. Do not try to make dramatic changes to your diet all at once. You might feel that you are missing out on your favorite foods and then be more likely to not follow the plan. Make small changes, and stick with them. Once those changes become habit, add a few more changes. · Try some of the following:  ? Make it a goal to eat a fruit or vegetable at every meal and at snacks. This will make it easy to get the recommended amount of fruits and vegetables each day.   ? Try yogurt topped with fruit and nuts for a snack or healthy dessert. ? Add lettuce, tomato, cucumber, and onion to sandwiches. ? Combine a ready-made pizza crust with low-fat mozzarella cheese and lots of vegetable toppings. Try using tomatoes, squash, spinach, broccoli, carrots, cauliflower, and onions. ? Have a variety of cut-up vegetables with a low-fat dip as an appetizer instead of chips and dip. ? Sprinkle sunflower seeds or chopped almonds over salads. Or try adding chopped walnuts or almonds to cooked vegetables. ? Try some vegetarian meals using beans and peas. Add garbanzo or kidney beans to salads. Make burritos and tacos with mashed norris beans or black beans. Where can you learn more? Go to http://www.wheatley.com/  Enter H967 in the search box to learn more about \"DASH Diet: Care Instructions. \"  Current as of: January 10, 2022               Content Version: 13.2  © 0733-8440 [employer]. Care instructions adapted under license by [employer] (which disclaims liability or warranty for this information). If you have questions about a medical condition or this instruction, always ask your healthcare professional. [address] any warranty or liability for your use of this information.

## 2022-04-23 LAB
25(OH)D3 SERPL-MCNC: 29.2 NG/ML (ref 30–100)
CREAT UR-MCNC: 53 MG/DL
EST. AVERAGE GLUCOSE BLD GHB EST-MCNC: 209 MG/DL
HBA1C MFR BLD: 8.9 % (ref 4–5.6)
MICROALBUMIN UR-MCNC: 7.48 MG/DL
MICROALBUMIN/CREAT UR-RTO: 141 MG/G (ref 0–30)

## 2022-04-25 DIAGNOSIS — E11.65 TYPE 2 DIABETES MELLITUS WITH HYPERGLYCEMIA, WITH LONG-TERM CURRENT USE OF INSULIN (HCC): Primary | ICD-10-CM

## 2022-04-25 DIAGNOSIS — Z79.4 TYPE 2 DIABETES MELLITUS WITH HYPERGLYCEMIA, WITH LONG-TERM CURRENT USE OF INSULIN (HCC): Primary | ICD-10-CM

## 2022-04-25 RX ORDER — GLIMEPIRIDE 2 MG/1
2 TABLET ORAL
Qty: 90 TABLET | Refills: 1 | Status: SHIPPED | OUTPATIENT
Start: 2022-04-25 | End: 2022-10-28 | Stop reason: SDUPTHER

## 2022-04-25 NOTE — PROGRESS NOTES
Your A1C has moved in the right direction, down to 8.9; I recommend that we add another medication to help bring this down further. The medication is called glimepiride and is taken once daily with breakfast; it can cause low blood sugar if you don't eat, so make sure that you are taking it with a meal.  Your urine contains a lot of protein, indicating that your kidneys are being affected by your diabetes; all the more reason to lower your A1C. Your vitamin D level is almost normal; I recommend that we discontinue the prescription supplement and have you start on an OTC supplement of 2,000IU daily.

## 2022-07-27 ENCOUNTER — OFFICE VISIT (OUTPATIENT)
Dept: FAMILY MEDICINE CLINIC | Age: 72
End: 2022-07-27
Payer: MEDICARE

## 2022-07-27 VITALS
DIASTOLIC BLOOD PRESSURE: 72 MMHG | OXYGEN SATURATION: 97 % | BODY MASS INDEX: 28.05 KG/M2 | SYSTOLIC BLOOD PRESSURE: 138 MMHG | RESPIRATION RATE: 18 BRPM | TEMPERATURE: 98.1 F | WEIGHT: 225.6 LBS | HEART RATE: 84 BPM | HEIGHT: 75 IN

## 2022-07-27 DIAGNOSIS — R06.00 PND (PAROXYSMAL NOCTURNAL DYSPNEA): ICD-10-CM

## 2022-07-27 DIAGNOSIS — E11.9 TYPE 2 DIABETES MELLITUS WITHOUT COMPLICATION, WITHOUT LONG-TERM CURRENT USE OF INSULIN (HCC): Primary | ICD-10-CM

## 2022-07-27 DIAGNOSIS — I10 PRIMARY HYPERTENSION: ICD-10-CM

## 2022-07-27 LAB
ALBUMIN SERPL-MCNC: 3.6 G/DL (ref 3.5–5)
ALBUMIN/GLOB SERPL: 1.2 {RATIO} (ref 1.1–2.2)
ALP SERPL-CCNC: 57 U/L (ref 45–117)
ALT SERPL-CCNC: 30 U/L (ref 12–78)
ANION GAP SERPL CALC-SCNC: 10 MMOL/L (ref 5–15)
AST SERPL-CCNC: 22 U/L (ref 15–37)
BILIRUB SERPL-MCNC: 0.4 MG/DL (ref 0.2–1)
BUN SERPL-MCNC: 20 MG/DL (ref 6–20)
BUN/CREAT SERPL: 16 (ref 12–20)
CALCIUM SERPL-MCNC: 9.3 MG/DL (ref 8.5–10.1)
CHLORIDE SERPL-SCNC: 107 MMOL/L (ref 97–108)
CO2 SERPL-SCNC: 24 MMOL/L (ref 21–32)
CREAT SERPL-MCNC: 1.23 MG/DL (ref 0.7–1.3)
EST. AVERAGE GLUCOSE BLD GHB EST-MCNC: 154 MG/DL
GLOBULIN SER CALC-MCNC: 3 G/DL (ref 2–4)
GLUCOSE SERPL-MCNC: 192 MG/DL (ref 65–100)
HBA1C MFR BLD: 7 % (ref 4–5.6)
POTASSIUM SERPL-SCNC: 4.4 MMOL/L (ref 3.5–5.1)
PROT SERPL-MCNC: 6.6 G/DL (ref 6.4–8.2)
SODIUM SERPL-SCNC: 141 MMOL/L (ref 136–145)

## 2022-07-27 PROCEDURE — 99214 OFFICE O/P EST MOD 30 MIN: CPT

## 2022-07-27 RX ORDER — OLMESARTAN MEDOXOMIL 5 MG/1
5 TABLET ORAL DAILY
Qty: 90 TABLET | Refills: 0 | Status: SHIPPED | OUTPATIENT
Start: 2022-07-27 | End: 2022-09-30 | Stop reason: SDUPTHER

## 2022-07-27 RX ORDER — ALBUTEROL SULFATE 90 UG/1
2 AEROSOL, METERED RESPIRATORY (INHALATION)
Qty: 18 G | Refills: 5 | Status: SHIPPED | OUTPATIENT
Start: 2022-07-27

## 2022-07-27 NOTE — PROGRESS NOTES
Celso Harrison is a 81 y.o. male presenting for/with:    Chief Complaint   Patient presents with    Follow-up     3 month follow up, about a month patient started feeling like he can't catch his breath. Wants inhaler       Visit Vitals  BP (!) 142/80 (BP 1 Location: Left upper arm, BP Patient Position: Sitting)   Pulse 84   Temp 98.1 °F (36.7 °C) (Temporal)   Resp 18   Ht 6' 3\" (1.905 m)   Wt 225 lb 9.6 oz (102.3 kg)   SpO2 97%   BMI 28.20 kg/m²        Pain Scale: /10  Pain Location:     1. \"Have you been to the ER, urgent care clinic since your last visit? Hospitalized since your last visit? \" No    2. \"Have you seen or consulted any other health care providers outside of the [address] [place] [address] since your last visit? \" No     3. For patients aged 39-70: Has the patient had a colonoscopy / FIT/ Cologuard? No      If the patient is female:    4. For patients aged 41-77: Has the patient had a mammogram within the past 2 years? No      5. For patients aged 21-65: Has the patient had a pap smear? No      Symptom review:  NO  Fever   NO  Shaking chills  NO  Cough  NO  Body aches  NO  Coughing up blood  NO  Chest congestion  NO  Chest pain  YES  Shortness of breath  NO  Profound Loss of smell/taste  NO  Nausea/Vomiting   NO  Loose stool/Diarrhea  NO  any skin issues    Patient Risk Factors Reviewed as follows:  NO  have you been in Close contact with confirmed COVID19 patient   NO  History of recent travel to affected geographical areas within the past 14 days  NO  COPD  NO  Active Cancer/Leukemia/Lymphoma/Chemotherapy  NO  Oral steroid use  NO  Pregnant  YES  Diabetes Mellitus  YES  Heart disease  NO  Asthma  NO Health care worker at home  [address] care worker  NO Is there a Pregnant Woman in the home  NO Dialysis pt in the home   NO a large number of people living in the home    No flowsheet data found. Fall Risk Assessment, last 12 mths 4/22/2022   Able to walk?  Yes   Fall in past 12 months? 0   Do you feel unsteady? 0   Are you worried about falling 1   Is TUG test greater than 12 seconds? 0   Is the gait abnormal? 0       3 most recent PHQ Screens 4/22/2022   Little interest or pleasure in doing things Not at all   Feeling down, depressed, irritable, or hopeless Not at all   Total Score PHQ 2 0     Abuse Screening Questionnaire 1/21/2022   Do you ever feel afraid of your partner? N   Are you in a relationship with someone who physically or mentally threatens you? N   Is it safe for you to go home?  Y       ADL Assessment 1/21/2022   Feeding yourself No Help Needed   Getting from bed to chair No Help Needed   Getting dressed No Help Needed   Bathing or showering No Help Needed   Walk across the room (includes cane/walker) No Help Needed   Using the telphone No Help Needed   Taking your medications No Help Needed   Preparing meals No Help Needed   Managing money (expenses/bills) No Help Needed   Moderately strenuous housework (laundry) No Help Needed   Shopping for personal items (toiletries/medicines) No Help Needed   Shopping for groceries No Help Needed   Driving No Help Needed   Climbing a flight of stairs No Help Needed   Getting to places beyond walking distances No Help Needed      Advance Care Planning 1/21/2022   Patient's Healthcare Decision Maker is: Legal Next of Kin   Confirm Advance Directive None   Patient Would Like to Complete Advance Directive No

## 2022-07-27 NOTE — PROGRESS NOTES
Chief Complaint   Patient presents with    Follow-up     3 month follow up, about a month patient started feeling like he can't catch his breath. Wants inhaler       HPI:    Tania Finney is a 81 y.o. male who presents for chronic follow-up. T2DM:  Restarted on metformin and Amaryl a few months ago; doing well without SEs, home sugars 100-160, denies hypoglycemic events. HTN:  Stopped taking lisinopril a couple of months ago because it made him feel \"woozy\". New issues:  Occasionaly episodes of SOB at bedtime onset about 1 month ago; occurs 1-2 times/week, feels like he can't take a deep breath, goes away if he gets out of bed and moves around for a little bit. Denies MOLINA, daytime SOB, CP, palpitations, fatigue, cough, fever, chills. No Known Allergies    Current Outpatient Medications   Medication Sig    olmesartan (BENICAR) 5 mg tablet Take 1 Tablet by mouth in the morning. albuterol (PROVENTIL HFA, VENTOLIN HFA, PROAIR HFA) 90 mcg/actuation inhaler Take 2 Puffs by inhalation every four (4) hours as needed for Wheezing. glimepiride (AMARYL) 2 mg tablet Take 1 Tablet by mouth daily (with breakfast). QUEtiapine (SEROquel) 300 mg tablet Take 2 Tablets by mouth nightly. metFORMIN (GLUCOPHAGE) 1,000 mg tablet Take 1 Tablet by mouth two (2) times daily (with meals). ergocalciferol (ERGOCALCIFEROL) 1,250 mcg (50,000 unit) capsule Take 1 Capsule by mouth every seven (7) days. No current facility-administered medications for this visit. Past Medical History:   Diagnosis Date    Diabetes ([facility] [address]        Family History   Problem Relation Age of Onset    Diabetes Father        Review of Systems   Constitutional: Negative. Negative for chills, fever and malaise/fatigue. HENT: Negative. Eyes: Negative. Respiratory: Negative. Negative for cough and shortness of breath. Cardiovascular:  Positive for PND. Negative for chest pain, palpitations and leg swelling.    Gastrointestinal: Negative. Negative for abdominal pain, nausea and vomiting. Genitourinary: Negative. Musculoskeletal: Negative. Skin: Negative. Neurological: Negative. Negative for dizziness and headaches. Endo/Heme/Allergies: Negative. Psychiatric/Behavioral: Negative. Negative for depression. The patient is not nervous/anxious. /72   Pulse 84   Temp 98.1 °F (36.7 °C) (Temporal)   Resp 18   Ht 6' 3\" (1.905 m)   Wt 225 lb 9.6 oz (102.3 kg)   SpO2 97%   BMI 28.20 kg/m²     Wt Readings from Last 3 Encounters:   07/27/22 225 lb 9.6 oz (102.3 kg)   04/22/22 213 lb 2 oz (96.7 kg)   01/21/22 207 lb 6.4 oz (94.1 kg)       3 most recent PHQ Screens 4/22/2022   Little interest or pleasure in doing things Not at all   Feeling down, depressed, irritable, or hopeless Not at all   Total Score PHQ 2 0       Physical Exam  Vitals and nursing note reviewed. Constitutional:       General: He is not in acute distress. Appearance: Normal appearance. He is normal weight. HENT:      Head: Normocephalic and atraumatic. Mouth/Throat:      Mouth: Mucous membranes are moist.      Pharynx: Oropharynx is clear. Eyes:      Extraocular Movements: Extraocular movements intact. Conjunctiva/sclera: Conjunctivae normal.      Pupils: Pupils are equal, round, and reactive to light. Neck:      Vascular: No carotid bruit. Cardiovascular:      Rate and Rhythm: Normal rate and regular rhythm. Occasional Extrasystoles are present. Pulses: Normal pulses. Heart sounds: Normal heart sounds. No murmur heard. No friction rub. No gallop. Pulmonary:      Effort: Pulmonary effort is normal.      Breath sounds: Normal breath sounds. No wheezing, rhonchi or rales. Abdominal:      General: Bowel sounds are normal.      Palpations: Abdomen is soft. Musculoskeletal:         General: Normal range of motion. Cervical back: Normal range of motion and neck supple.    Lymphadenopathy:      Cervical: No cervical adenopathy. Skin:     General: Skin is warm and dry. Neurological:      General: No focal deficit present. Mental Status: He is alert and oriented to person, place, and time. Psychiatric:         Mood and Affect: Mood normal.         Behavior: Behavior normal.         Thought Content: Thought content normal.         Judgment: Judgment normal.     ASSESSMENT AND PLAN:       ICD-10-CM ICD-9-CM    1. Type 2 diabetes mellitus without complication, without long-term current use of insulin (HCC)  E11.9 250.00 COLLECTION VENOUS BLOOD,VENIPUNCTURE      HEMOGLOBIN A1C WITH EAG      METABOLIC PANEL, COMPREHENSIVE      METABOLIC PANEL, COMPREHENSIVE      HEMOGLOBIN A1C WITH EAG      2. Primary hypertension  I10 401.9 olmesartan (BENICAR) 5 mg tablet      3. PND (paroxysmal nocturnal dyspnea)  R06.00 786.09 albuterol (PROVENTIL HFA, VENTOLIN HFA, PROAIR HFA) 90 mcg/actuation inhaler          Switch to olmesartan at bedtime; discussed importance of keep BP within goal to avoid end-organ disease. Continue to work on following diabetic diet and increasing physical activity to promote normoglycemia. Albuterol MDI for PRN SOB; he is to notify me if symptoms worsen or escalate in frequency. Medication Side Effects and Warnings were discussed with patient:  yes  Patient Labs were reviewed:  yes  Patient Past Records were reviewed:  yes      Patient aware of plan of care and verbalized understanding. Questions answered. RTC 3 months or sooner if needed. On this date 07/27/2022 I have spent 30 minutes reviewing previous notes, test results and face to face with the patient discussing the diagnosis and importance of compliance with the treatment plan as well as documenting on the day of the visit.     Michelle Romero NP

## 2022-08-01 ENCOUNTER — TELEPHONE (OUTPATIENT)
Dept: FAMILY MEDICINE CLINIC | Age: 72
End: 2022-08-01

## 2022-08-10 ENCOUNTER — OFFICE VISIT (OUTPATIENT)
Dept: FAMILY MEDICINE CLINIC | Age: 72
End: 2022-08-10
Payer: MEDICARE

## 2022-08-10 VITALS
OXYGEN SATURATION: 97 % | WEIGHT: 217.2 LBS | DIASTOLIC BLOOD PRESSURE: 60 MMHG | RESPIRATION RATE: 18 BRPM | HEART RATE: 74 BPM | BODY MASS INDEX: 27.01 KG/M2 | HEIGHT: 75 IN | SYSTOLIC BLOOD PRESSURE: 110 MMHG

## 2022-08-10 DIAGNOSIS — Z09 HOSPITAL DISCHARGE FOLLOW-UP: Primary | ICD-10-CM

## 2022-08-10 DIAGNOSIS — I10 PRIMARY HYPERTENSION: ICD-10-CM

## 2022-08-10 DIAGNOSIS — I50.21 ACUTE HFREF (HEART FAILURE WITH REDUCED EJECTION FRACTION) (HCC): ICD-10-CM

## 2022-08-10 PROCEDURE — 99496 TRANSJ CARE MGMT HIGH F2F 7D: CPT

## 2022-08-10 RX ORDER — CARVEDILOL 6.25 MG/1
3.12 TABLET ORAL 2 TIMES DAILY
COMMUNITY
Start: 2022-08-09 | End: 2022-10-28

## 2022-08-10 RX ORDER — ASPIRIN 81 MG/1
81 TABLET ORAL DAILY
COMMUNITY
Start: 2022-08-06 | End: 2023-08-06

## 2022-08-10 RX ORDER — FUROSEMIDE 40 MG/1
40 TABLET ORAL DAILY
COMMUNITY
Start: 2022-08-05

## 2022-08-10 RX ORDER — SPIRONOLACTONE 25 MG/1
25 TABLET ORAL DAILY
COMMUNITY
Start: 2022-08-09

## 2022-08-10 RX ORDER — POTASSIUM CHLORIDE 750 MG/1
10 TABLET, EXTENDED RELEASE ORAL DAILY
COMMUNITY
Start: 2022-08-05 | End: 2022-10-28

## 2022-08-10 NOTE — PROGRESS NOTES
Courtney Flores is a 81 y.o. male presenting for/with:    Chief Complaint   Patient presents with    Hospital Follow Up     Congestive heart failure on Aug 5-6, 2022       There were no vitals taken for this visit. Pain Scale: /10  Pain Location:     1. \"Have you been to the ER, urgent care clinic since your last visit? Hospitalized since your last visit? \" When Aug 5-6    2. \"Have you seen or consulted any other health care providers outside of the [address] [place] [address] since your last visit? \" Yes Where: [facility]       3. For patients aged 39-70: Has the patient had a colonoscopy / FIT/ Cologuard? No      If the patient is female:    4. For patients aged 41-77: Has the patient had a mammogram within the past 2 years? NA - based on age or sex      11. For patients aged 21-65: Has the patient had a pap smear? NA - based on age or sex      Symptom review:  NO  Fever   NO  Shaking chills  NO  Cough  NO  Body aches  NO  Coughing up blood  NO  Chest congestion  NO  Chest pain  NO  Shortness of breath  NO  Profound Loss of smell/taste  NO  Nausea/Vomiting   NO  Loose stool/Diarrhea  NO  any skin issues    Patient Risk Factors Reviewed as follows:  NO  have you been in Close contact with confirmed COVID19 patient   NO  History of recent travel to affected geographical areas within the past 14 days  NO  COPD  NO  Active Cancer/Leukemia/Lymphoma/Chemotherapy  NO  Oral steroid use  NO  Pregnant  YES  Diabetes Mellitus  YES  Heart disease  NO  Asthma  NO Health care worker at home  NO Health care worker  NO Is there a Pregnant Woman in the home  NO Dialysis pt in the home   NO a large number of people living in the home    No flowsheet data found. Fall Risk Assessment, last 12 mths 8/10/2022   Able to walk? Yes   Fall in past 12 months? 0   Do you feel unsteady? 0   Are you worried about falling 0   Is TUG test greater than 12 seconds?  -   Is the gait abnormal? -       3 most recent PHQ Screens 8/10/2022   Little interest or pleasure in doing things Not at all   Feeling down, depressed, irritable, or hopeless Not at all   Total Score PHQ 2 0     Abuse Screening Questionnaire 1/21/2022   Do you ever feel afraid of your partner? N   Are you in a relationship with someone who physically or mentally threatens you? N   Is it safe for you to go home?  Y       ADL Assessment 1/21/2022   Feeding yourself No Help Needed   Getting from bed to chair No Help Needed   Getting dressed No Help Needed   Bathing or showering No Help Needed   Walk across the room (includes cane/walker) No Help Needed   Using the telphone No Help Needed   Taking your medications No Help Needed   Preparing meals No Help Needed   Managing money (expenses/bills) No Help Needed   Moderately strenuous housework (laundry) No Help Needed   Shopping for personal items (toiletries/medicines) No Help Needed   Shopping for groceries No Help Needed   Driving No Help Needed   Climbing a flight of stairs No Help Needed   Getting to places beyond walking distances No Help Needed      Advance Care Planning 1/21/2022   Patient's Healthcare Decision Maker is: Legal Next of Kin   Confirm Advance Directive None   Patient Would Like to Complete Advance Directive No

## 2022-08-10 NOTE — PROGRESS NOTES
Chief Complaint   Patient presents with    Hospital Follow Up     Congestive heart failure on Aug 5-6, 2022       HPI:    Hallie Ruiz is a 81 y.o. male who presents for hospital follow-up. He call EMS on August 4 for an episode of SOB which mostly resolved on the way to the ER; his ED workup showed a small right-sided pleural effusion and he was admitted for further workup. ROBLES showed EF 25% without discernible cause; no other abnormal findings were noted during admission. He was discharged home on carvedilol, furosemide, and spironolactone and has been doing well since discharge without further episodes of SOB; he denies CP, palpitations, leg edema, weight gain, or other problems. He saw Dr. Cass Pineda at [address] [place] [address] on yesterday with plans to proceed with [place] next week and cardiac catheterization if warranted. No Known Allergies    Current Outpatient Medications   Medication Sig    carvediloL (COREG) 6.25 mg tablet Take 3.125 mg by mouth two (2) times a day. aspirin delayed-release 81 mg tablet Take 81 mg by mouth in the morning. furosemide (LASIX) 40 mg tablet Take 40 mg by mouth in the morning. potassium chloride (KLOR-CON M10) 10 mEq tablet Take 10 mEq by mouth in the morning. spironolactone (ALDACTONE) 25 mg tablet Take 25 mg by mouth in the morning. olmesartan (BENICAR) 5 mg tablet Take 1 Tablet by mouth in the morning. albuterol (PROVENTIL HFA, VENTOLIN HFA, PROAIR HFA) 90 mcg/actuation inhaler Take 2 Puffs by inhalation every four (4) hours as needed for Wheezing. glimepiride (AMARYL) 2 mg tablet Take 1 Tablet by mouth daily (with breakfast). QUEtiapine (SEROquel) 300 mg tablet Take 2 Tablets by mouth nightly. metFORMIN (GLUCOPHAGE) 1,000 mg tablet Take 1 Tablet by mouth two (2) times daily (with meals). ergocalciferol (ERGOCALCIFEROL) 1,250 mcg (50,000 unit) capsule Take 1 Capsule by mouth every seven (7) days.      No current facility-administered medications for this visit. Past Medical History:   Diagnosis Date    Diabetes ([address]        Family History   Problem Relation Age of Onset    Diabetes Father        Review of Systems   Constitutional: Negative. Negative for chills, fever and malaise/fatigue. HENT: Negative. Eyes: Negative. Respiratory: Negative. Negative for cough and shortness of breath. Cardiovascular: Negative. Negative for chest pain, palpitations and leg swelling. Gastrointestinal: Negative. Negative for abdominal pain, nausea and vomiting. Genitourinary: Negative. Musculoskeletal: Negative. Skin: Negative. Neurological: Negative. Negative for dizziness and headaches. Endo/Heme/Allergies: Negative. Psychiatric/Behavioral: Negative. Negative for depression. The patient is not nervous/anxious. /60 (BP 1 Location: Left upper arm, BP Patient Position: Sitting, BP Cuff Size: Adult long)   Pulse 74   Resp 18   Ht 6' 3\" (1.905 m)   Wt 217 lb 3.2 oz (98.5 kg)   SpO2 97%   BMI 27.15 kg/m²     Wt Readings from Last 3 Encounters:   08/10/22 217 lb 3.2 oz (98.5 kg)   07/27/22 225 lb 9.6 oz (102.3 kg)   04/22/22 213 lb 2 oz (96.7 kg)       3 most recent PHQ Screens 8/10/2022   Little interest or pleasure in doing things Not at all   Feeling down, depressed, irritable, or hopeless Not at all   Total Score PHQ 2 0       Physical Exam  Vitals and nursing note reviewed. Constitutional:       General: He is not in acute distress. Appearance: Normal appearance. HENT:      Head: Normocephalic and atraumatic. Mouth/Throat:      Mouth: Mucous membranes are moist.      Pharynx: Oropharynx is clear. Eyes:      Extraocular Movements: Extraocular movements intact. Conjunctiva/sclera: Conjunctivae normal.      Pupils: Pupils are equal, round, and reactive to light. Neck:      Vascular: No carotid bruit.    Cardiovascular:      Rate and Rhythm: Normal rate and regular rhythm. Pulses: Normal pulses. Heart sounds: Normal heart sounds. No murmur heard. No friction rub. No gallop. Pulmonary:      Effort: Pulmonary effort is normal.      Breath sounds: Normal breath sounds. No wheezing, rhonchi or rales. Abdominal:      General: Bowel sounds are normal.      Palpations: Abdomen is soft. Musculoskeletal:         General: Normal range of motion. Cervical back: Normal range of motion and neck supple. Lymphadenopathy:      Cervical: No cervical adenopathy. Skin:     General: Skin is warm and dry. Neurological:      General: No focal deficit present. Mental Status: He is alert and oriented to person, place, and time. Psychiatric:         Mood and Affect: Mood normal.         Behavior: Behavior normal.         Thought Content: Thought content normal.         Judgment: Judgment normal.       ASSESSMENT AND PLAN:       ICD-10-CM ICD-9-CM    1. Hospital discharge follow-up  Z09 V67.59 [place] DISCHARGE MEDS RECONCILED W/ CURRENT OUTPATIENT MED LIST      2. Acute HFrEF (heart failure with reduced ejection fraction) ([facility])  I50.21 428.21       3. Primary hypertension  I10 401.9           Doing well since discharge. Continue to monitor home weights, notify for >3 pound gain in 1 day or >5 pound gain in 1 week; monitor BP, notify if above goal 140/90. Return to ER for CP, palpitations, SOB. Medication Side Effects and Warnings were discussed with patient:  yes  Patient Labs were reviewed:  yes  Patient Past Records were reviewed:  yes      Patient aware of plan of care and verbalized understanding. Questions answered. RTC 2 months or sooner if needed. On this date 08/10/2022 I have spent 30 minutes reviewing previous notes, test results and face to face with the patient discussing the diagnosis and importance of compliance with the treatment plan as well as documenting on the day of the visit.     Barrie Mejía NP

## 2022-08-19 DIAGNOSIS — I10 PRIMARY HYPERTENSION: ICD-10-CM

## 2022-08-22 ENCOUNTER — TELEPHONE (OUTPATIENT)
Dept: FAMILY MEDICINE CLINIC | Age: 72
End: 2022-08-22

## 2022-08-22 RX ORDER — OLMESARTAN MEDOXOMIL 5 MG/1
5 TABLET ORAL DAILY
Qty: 90 TABLET | Refills: 0 | OUTPATIENT
Start: 2022-08-22

## 2022-08-22 NOTE — TELEPHONE ENCOUNTER
Pt states there was a mix up with [facility]. They gave him the wrong amount of pills so the RX request can be canceled.

## 2022-08-22 NOTE — TELEPHONE ENCOUNTER
Pt called requesting refill of olmesartan. Requests 90 day supply.  Using [facility] [address] PASTORA BACH

## 2022-09-29 ENCOUNTER — TELEPHONE (OUTPATIENT)
Dept: FAMILY MEDICINE CLINIC | Age: 72
End: 2022-09-29

## 2022-09-29 DIAGNOSIS — I10 PRIMARY HYPERTENSION: ICD-10-CM

## 2022-09-29 NOTE — TELEPHONE ENCOUNTER
----- Message from Zuleyka Net sent at 9/29/2022  9:28 AM EDT -----  Subject: Refill Request    QUESTIONS  Name of Medication? olmesartan (BENICAR) 5 mg tablet  Patient-reported dosage and instructions? 2 daily   How many days do you have left? 7  [facility] Pharm[facility]? [address] [facility]  Pharmacy phone number (if available)? [phone]  Additional Information for Provider[facility] to   start doubling the dose of this medication daily so he ran out sooner than   usual and would like 10mg once daily to be called in instead. ---------------------------------------------------------------------------  --------------  [employer] INFO  What is the best way for the office to contact you? OK to leave message on   voicemail  Preferred Call Back Phone Number? [phone]  ---------------------------------------------------------------------------  --------------  SCRIPT ANSWERS  Relationship to Patient?  Self

## 2022-09-30 RX ORDER — OLMESARTAN MEDOXOMIL 5 MG/1
10 TABLET ORAL DAILY
Qty: 180 TABLET | Refills: 1 | Status: SHIPPED | OUTPATIENT
Start: 2022-09-30

## 2022-09-30 NOTE — TELEPHONE ENCOUNTER
Patient has been advised per Flaco Anand Np and is agreeable to continue 2 pills daily at this time .

## 2022-10-28 ENCOUNTER — OFFICE VISIT (OUTPATIENT)
Dept: FAMILY MEDICINE CLINIC | Age: 72
End: 2022-10-28
Payer: MEDICARE

## 2022-10-28 VITALS
DIASTOLIC BLOOD PRESSURE: 70 MMHG | HEART RATE: 70 BPM | WEIGHT: 216 LBS | HEIGHT: 75 IN | BODY MASS INDEX: 26.86 KG/M2 | OXYGEN SATURATION: 98 % | RESPIRATION RATE: 19 BRPM | SYSTOLIC BLOOD PRESSURE: 98 MMHG

## 2022-10-28 DIAGNOSIS — E11.9 TYPE 2 DIABETES MELLITUS WITHOUT COMPLICATION, WITHOUT LONG-TERM CURRENT USE OF INSULIN (HCC): Primary | ICD-10-CM

## 2022-10-28 DIAGNOSIS — F31.70 MIXED BIPOLAR I DISORDER IN REMISSION (HCC): ICD-10-CM

## 2022-10-28 DIAGNOSIS — I10 PRIMARY HYPERTENSION: ICD-10-CM

## 2022-10-28 DIAGNOSIS — I50.42 CHRONIC COMBINED SYSTOLIC AND DIASTOLIC HEART FAILURE (HCC): ICD-10-CM

## 2022-10-28 LAB — HBA1C MFR BLD HPLC: 11.2 %

## 2022-10-28 PROCEDURE — 83036 HEMOGLOBIN GLYCOSYLATED A1C: CPT

## 2022-10-28 PROCEDURE — 3074F SYST BP LT 130 MM HG: CPT

## 2022-10-28 PROCEDURE — 3046F HEMOGLOBIN A1C LEVEL >9.0%: CPT

## 2022-10-28 PROCEDURE — 1123F ACP DISCUSS/DSCN MKR DOCD: CPT

## 2022-10-28 PROCEDURE — 99214 OFFICE O/P EST MOD 30 MIN: CPT

## 2022-10-28 PROCEDURE — 3078F DIAST BP <80 MM HG: CPT

## 2022-10-28 RX ORDER — GLIMEPIRIDE 4 MG/1
4 TABLET ORAL
Qty: 90 TABLET | Refills: 1 | Status: SHIPPED | OUTPATIENT
Start: 2022-10-28

## 2022-10-28 RX ORDER — CARVEDILOL 6.25 MG/1
3.12 TABLET ORAL DAILY
Qty: 1 TABLET | Refills: 0
Start: 2022-10-28

## 2022-10-28 NOTE — PROGRESS NOTES
Chief Complaint   Patient presents with    Follow-up     3 month for heart       HPI:    Sidney Raman is a 81 y.o. male who presents for chronic follow-up. T2DM:  Restarted on metformin and Amaryl; doing well without SEs. Notes that home sugars have been up over the last couple of months, some in the 300s; he admits that he has been eating more sugars, started drinking sodas again. A1C 10.5-->8.9-->7.0. HF:  TTE performed in August 2022 for an episode of SOB showed LVEF 25% without discernible cause; follow-up PCI did not show any significant CAD. Denies recent episodes of CP, palpitations; has occasional fleeting episodes of SOB. Reports some positional dizziness in the evening. No Known Allergies    Current Outpatient Medications   Medication Sig    glimepiride (AMARYL) 4 mg tablet Take 1 Tablet by mouth daily (with breakfast). carvediloL (COREG) 6.25 mg tablet Take 0.5 Tablets by mouth daily. olmesartan (BENICAR) 5 mg tablet Take 2 Tablets by mouth daily. aspirin delayed-release 81 mg tablet Take 81 mg by mouth in the morning. furosemide (LASIX) 40 mg tablet Take 40 mg by mouth in the morning. spironolactone (ALDACTONE) 25 mg tablet Take 25 mg by mouth in the morning. albuterol (PROVENTIL HFA, VENTOLIN HFA, PROAIR HFA) 90 mcg/actuation inhaler Take 2 Puffs by inhalation every four (4) hours as needed for Wheezing. QUEtiapine (SEROquel) 300 mg tablet Take 2 Tablets by mouth nightly. metFORMIN (GLUCOPHAGE) 1,000 mg tablet Take 1 Tablet by mouth two (2) times daily (with meals). No current facility-administered medications for this visit. Past Medical History:   Diagnosis Date    Diabetes ([facility]        Family History   Problem Relation Age of Onset    Diabetes Father        Review of Systems   Constitutional: Negative. Negative for chills, fever and malaise/fatigue. HENT: Negative. Eyes: Negative. Respiratory: Negative.   Negative for cough and shortness of breath. Cardiovascular: Negative. Negative for chest pain, palpitations and leg swelling. Gastrointestinal: Negative. Negative for abdominal pain, nausea and vomiting. Genitourinary: Negative. Musculoskeletal: Negative. Skin: Negative. Neurological: Negative. Negative for dizziness and headaches. Endo/Heme/Allergies: Negative. Psychiatric/Behavioral: Negative. Negative for depression. The patient is not nervous/anxious. BP 98/70 (BP 1 Location: Left upper arm, BP Patient Position: Sitting, BP Cuff Size: Adult long)   Pulse 70   Resp 19   Ht 6' 3\" (1.905 m)   Wt 216 lb (98 kg)   SpO2 98%   BMI 27.00 kg/m²     Wt Readings from Last 3 Encounters:   10/28/22 216 lb (98 kg)   08/10/22 217 lb 3.2 oz (98.5 kg)   07/27/22 225 lb 9.6 oz (102.3 kg)       3 most recent PHQ Screens 10/28/2022   Little interest or pleasure in doing things Several days   Feeling down, depressed, irritable, or hopeless Several days   Total Score PHQ 2 2       Physical Exam  Vitals and nursing note reviewed. Constitutional:       General: He is not in acute distress. Appearance: Normal appearance. He is overweight. HENT:      Head: Normocephalic and atraumatic. Mouth/Throat:      Mouth: Mucous membranes are moist.      Pharynx: Oropharynx is clear. Eyes:      Extraocular Movements: Extraocular movements intact. Conjunctiva/sclera: Conjunctivae normal.      Pupils: Pupils are equal, round, and reactive to light. Neck:      Vascular: No carotid bruit. Cardiovascular:      Rate and Rhythm: Normal rate and regular rhythm. Pulses: Normal pulses. Heart sounds: Normal heart sounds. No murmur heard. No friction rub. No gallop. Pulmonary:      Effort: Pulmonary effort is normal.      Breath sounds: Normal breath sounds. No wheezing, rhonchi or rales. Abdominal:      General: Bowel sounds are normal.      Palpations: Abdomen is soft.    Musculoskeletal:         General: Normal range of motion. Cervical back: Normal range of motion and neck supple. Lymphadenopathy:      Cervical: No cervical adenopathy. Skin:     General: Skin is warm and dry. Neurological:      General: No focal deficit present. Mental Status: He is alert and oriented to person, place, and time. Psychiatric:         Mood and Affect: Mood normal.         Behavior: Behavior normal.         Thought Content: Thought content normal.         Judgment: Judgment normal.       ASSESSMENT AND PLAN:       ICD-10-CM ICD-9-CM    1. Type 2 diabetes mellitus without complication, without long-term current use of insulin ([facility])  E11.9 250.00 COLLECTION CAPILLARY BLOOD SPECIMEN      AMB POC HEMOGLOBIN A1C      glimepiride (AMARYL) 4 mg tablet      2. Primary hypertension  I10 401.9       3. Chronic combined systolic and diastolic heart failure ([facility])  I50.42 428.42       4. Mixed bipolar I disorder in remission ([facility])  F31.70 296.66           A1C 11.2; concentrate on dietary compliance, increase Amaryl. Would benefit from SGLT2i or GLP1-RA with CVD benefits; he doesn't think he can afford these medications, but he has applied for Medicaid and hopes this will help him better afford his meds. Reviewing current medications, it appears that he may be taking additional doses of carvedilol and/or olmesartan which is the likely cause of his dizziness; encourage him to bring medication bottles for review. Declines flu vaccine today. Medication Side Effects and Warnings were discussed with patient:  yes  Patient Labs were reviewed:  yes  Patient Past Records were reviewed:  yes      Patient aware of plan of care and verbalized understanding. Questions answered. RTC 3 months or sooner if needed.     On this date 10/28/2022 I have spent 30 minutes reviewing previous notes, test results and face to face with the patient discussing the diagnosis and importance of compliance with the treatment plan as well as documenting on the day of the visit.     Geneva Haines, NP

## 2022-10-28 NOTE — PROGRESS NOTES
1. \"Have you been to the ER, urgent care clinic since your last visit? Hospitalized since your last visit? \" No    2. \"Have you seen or consulted any other health care providers outside of the [address] [place] [address] since your last visit? \" No     3. For patients aged 39-70: Has the patient had a colonoscopy / FIT/ Cologuard? Yes - no Care Gap present      If the patient is female:    4. For patients aged 41-77: Has the patient had a mammogram within the past 2 years? NA - based on age or sex      11. For patients aged 21-65: Has the patient had a pap smear? NA - based on age or sex.     Chief Complaint   Patient presents with    Follow-up     3 month for heart     Visit Vitals  BP 98/70 (BP 1 Location: Left upper arm, BP Patient Position: Sitting, BP Cuff Size: Adult long)   Pulse 70   Resp 19   Ht 6' 3\" (1.905 m)   Wt 216 lb (98 kg)   SpO2 98%   BMI 27.00 kg/m²

## 2023-01-30 ENCOUNTER — OFFICE VISIT (OUTPATIENT)
Dept: FAMILY MEDICINE CLINIC | Age: 73
End: 2023-01-30
Payer: MEDICARE

## 2023-01-30 VITALS
WEIGHT: 223.4 LBS | SYSTOLIC BLOOD PRESSURE: 110 MMHG | TEMPERATURE: 98.3 F | HEIGHT: 75 IN | DIASTOLIC BLOOD PRESSURE: 60 MMHG | HEART RATE: 81 BPM | RESPIRATION RATE: 20 BRPM | OXYGEN SATURATION: 96 % | BODY MASS INDEX: 27.78 KG/M2

## 2023-01-30 DIAGNOSIS — E78.2 MIXED HYPERLIPIDEMIA: ICD-10-CM

## 2023-01-30 DIAGNOSIS — E11.65 TYPE 2 DIABETES MELLITUS WITH HYPERGLYCEMIA, WITHOUT LONG-TERM CURRENT USE OF INSULIN (HCC): ICD-10-CM

## 2023-01-30 DIAGNOSIS — F31.70 MIXED BIPOLAR I DISORDER IN REMISSION (HCC): ICD-10-CM

## 2023-01-30 DIAGNOSIS — Z91.119 NONCOMPLIANCE WITH DIET AND MEDICATION REGIMEN: ICD-10-CM

## 2023-01-30 DIAGNOSIS — I50.20 HFREF (HEART FAILURE WITH REDUCED EJECTION FRACTION) (HCC): ICD-10-CM

## 2023-01-30 DIAGNOSIS — Z23 ENCOUNTER FOR IMMUNIZATION: ICD-10-CM

## 2023-01-30 DIAGNOSIS — Z00.00 MEDICARE ANNUAL WELLNESS VISIT, SUBSEQUENT: Primary | ICD-10-CM

## 2023-01-30 DIAGNOSIS — I10 PRIMARY HYPERTENSION: ICD-10-CM

## 2023-01-30 DIAGNOSIS — Z91.14 NONCOMPLIANCE WITH DIET AND MEDICATION REGIMEN: ICD-10-CM

## 2023-01-30 PROBLEM — I50.21 ACUTE HFREF (HEART FAILURE WITH REDUCED EJECTION FRACTION) (HCC): Status: RESOLVED | Noted: 2022-08-05 | Resolved: 2023-01-30

## 2023-01-30 LAB — HBA1C MFR BLD HPLC: 10.8 %

## 2023-01-30 PROCEDURE — G8427 DOCREV CUR MEDS BY ELIG CLIN: HCPCS

## 2023-01-30 PROCEDURE — 1101F PT FALLS ASSESS-DOCD LE1/YR: CPT

## 2023-01-30 PROCEDURE — G0009 ADMIN PNEUMOCOCCAL VACCINE: HCPCS

## 2023-01-30 PROCEDURE — 1123F ACP DISCUSS/DSCN MKR DOCD: CPT

## 2023-01-30 PROCEDURE — G8417 CALC BMI ABV UP PARAM F/U: HCPCS

## 2023-01-30 PROCEDURE — 90677 PCV20 VACCINE IM: CPT

## 2023-01-30 PROCEDURE — 3078F DIAST BP <80 MM HG: CPT

## 2023-01-30 PROCEDURE — 83036 HEMOGLOBIN GLYCOSYLATED A1C: CPT

## 2023-01-30 PROCEDURE — G8536 NO DOC ELDER MAL SCRN: HCPCS

## 2023-01-30 PROCEDURE — 99214 OFFICE O/P EST MOD 30 MIN: CPT

## 2023-01-30 PROCEDURE — 2022F DILAT RTA XM EVC RTNOPTHY: CPT

## 2023-01-30 PROCEDURE — 3046F HEMOGLOBIN A1C LEVEL >9.0%: CPT

## 2023-01-30 PROCEDURE — G0439 PPPS, SUBSEQ VISIT: HCPCS

## 2023-01-30 PROCEDURE — 3017F COLORECTAL CA SCREEN DOC REV: CPT

## 2023-01-30 PROCEDURE — G9717 DOC PT DX DEP/BP F/U NT REQ: HCPCS

## 2023-01-30 PROCEDURE — 3074F SYST BP LT 130 MM HG: CPT

## 2023-01-30 PROCEDURE — 36415 COLL VENOUS BLD VENIPUNCTURE: CPT

## 2023-01-30 RX ORDER — CHOLECALCIFEROL (VITAMIN D3) 125 MCG
2000 CAPSULE ORAL DAILY
COMMUNITY

## 2023-01-30 RX ORDER — ISOSORBIDE MONONITRATE 30 MG/1
30 TABLET, EXTENDED RELEASE ORAL DAILY
COMMUNITY
Start: 2022-11-22 | End: 2023-11-22

## 2023-01-30 RX ORDER — CARVEDILOL 6.25 MG/1
6.25 TABLET ORAL 2 TIMES DAILY
Qty: 1 TABLET | Refills: 0
Start: 2023-01-30

## 2023-01-30 RX ORDER — GLIMEPIRIDE 4 MG/1
4 TABLET ORAL 2 TIMES DAILY
Qty: 90 TABLET | Refills: 1 | Status: SHIPPED | OUTPATIENT
Start: 2023-01-30

## 2023-01-30 RX ORDER — LANOLIN ALCOHOL/MO/W.PET/CERES
400 CREAM (GRAM) TOPICAL DAILY
COMMUNITY
Start: 2023-01-15

## 2023-01-30 NOTE — PROGRESS NOTES
1. \"Have you been to the ER, urgent care clinic since your last visit? Hospitalized since your last visit? \" No    2. \"Have you seen or consulted any other health care providers outside of the [address] [place] [address] since your last visit? \" No     3. For patients aged 39-70: Has the patient had a colonoscopy / FIT/ Cologuard? Yes - no Care Gap present      If the patient is female:    4. For patients aged 41-77: Has the patient had a mammogram within the past 2 years? NA - based on age or sex      11. For patients aged 21-65: Has the patient had a pap smear? NA - based on age or sex    Chief Complaint   Patient presents with    Follow-up     Visit Vitals  /60 (BP 1 Location: Left upper arm, BP Patient Position: Sitting, BP Cuff Size: Large adult)   Pulse 81   Temp 98.3 °F (36.8 °C) (Oral)   Resp 20   Ht 6' 3\" (1.905 m)   Wt 223 lb 6.4 oz (101.3 kg)   SpO2 96%   BMI 27.92 kg/m²           This is the Subsequent Medicare Annual Wellness Exam, performed 12 months or more after the Initial AWV or the last Subsequent AWV    I have reviewed the patient's medical history in detail and updated the computerized patient record. Assessment/Plan   Education and counseling provided:  Are appropriate based on today's review and evaluation    1. Encounter for immunization  -     MT IMMUNIZ ADMIN,1 SINGLE/COMB VAC/TOXOID  -     PNEUMOCOCCAL, PCV20, PREVNAR 20, (AGE 18 YRS+), IM, PF  2. Chronic combined systolic and diastolic heart failure (HCC)  -     MT COLLECTION VENOUS BLOOD VENIPUNCTURE  -     TSH 3RD GENERATION; Future  -     carvediloL (COREG) 6.25 mg tablet; Take 1 Tablet by mouth two (2) times a day., No Print, Disp-1 Tablet, R-0  3. Mixed bipolar I disorder in remission ([facility]  4. Type 2 diabetes mellitus with hyperglycemia, without long-term current use of insulin (HCC)  -     MT COLLECTION VENOUS BLOOD VENIPUNCTURE  -     METABOLIC PANEL, COMPREHENSIVE;  Future  -     AMB POC HEMOGLOBIN A1C  -     TSH 3RD GENERATION; Future  5. Type 2 diabetes mellitus without complication, without long-term current use of insulin (HCC)  -     glimepiride (AMARYL) 4 mg tablet; Take 1 Tablet by mouth two (2) times a day., Normal, Disp-90 Tablet, R-1  6. Medicare annual wellness visit, subsequent       Depression Risk Factor Screening     3 most recent PHQ Screens 1/30/2023   Little interest or pleasure in doing things Not at all   Feeling down, depressed, irritable, or hopeless Not at all   Total Score PHQ 2 0       Alcohol & Drug Abuse Risk Screen    Do you average more than 1 drink per night or more than 7 drinks a week: No    In the past three months have you have had more than 4 drinks containing alcohol on one occasion: No          Functional Ability and Level of Safety    Hearing: Hearing is good. Activities of Daily Living: The home contains: no safety equipment. Patient does total self care      Ambulation: with no difficulty     Fall Risk:  Fall Risk Assessment, last 12 mths 1/30/2023   Able to walk? Yes   Fall in past 12 months? 0   Do you feel unsteady? 0   Are you worried about falling 0   Is TUG test greater than 12 seconds?  -   Is the gait abnormal? -      Abuse Screen:  Patient is not abused       Cognitive Screening    Has your family/caregiver stated any concerns about your memory: no     Cognitive Screening: Normal - Clock Drawing Test    Health Maintenance Due     Health Maintenance Due   Topic Date Due    Pneumococcal 65+ years (1 - PCV) Never done    Eye Exam Retinal or Dilated  Never done    DTaP/Tdap/Td series (1 - Tdap) Never done    Shingles Vaccine (1 of 2) Never done    COVID-19 Vaccine (3 - Booster for Manrique Peter series) 06/28/2022    Medicare Yearly Exam  01/22/2023       Patient Care Team   Patient Care Team:  Radha Recinos NP as PCP - General (Nurse Practitioner)  Radha Recinos NP as PCP - REHABILITATION [facility] Empaneled Provider    History     Patient Active Problem List   Diagnosis Code    Type 2 diabetes mellitus with hyperglycemia, without long-term current use of insulin ([facility]) E11.65    Mixed bipolar I disorder in remission ([facility] F31.70    Primary hypertension I10    Acute HFrEF (heart failure with reduced ejection fraction) ([facility]) I50.21     Past Medical History:   Diagnosis Date    Diabetes ([facility]       History reviewed. No pertinent surgical history. Current Outpatient Medications   Medication Sig Dispense Refill    isosorbide mononitrate ER (IMDUR) 30 mg tablet Take 30 mg by mouth daily. carvediloL (COREG) 6.25 mg tablet Take 1 Tablet by mouth two (2) times a day. 1 Tablet 0    glimepiride (AMARYL) 4 mg tablet Take 1 Tablet by mouth two (2) times a day. 90 Tablet 1    olmesartan (BENICAR) 5 mg tablet Take 2 Tablets by mouth daily. 180 Tablet 1    aspirin delayed-release 81 mg tablet Take 81 mg by mouth in the morning. spironolactone (ALDACTONE) 25 mg tablet Take 25 mg by mouth in the morning. albuterol (PROVENTIL HFA, VENTOLIN HFA, PROAIR HFA) 90 mcg/actuation inhaler Take 2 Puffs by inhalation every four (4) hours as needed for Wheezing. 18 g 5    QUEtiapine (SEROquel) 300 mg tablet Take 2 Tablets by mouth nightly. 180 Tablet 3    metFORMIN (GLUCOPHAGE) 1,000 mg tablet Take 1 Tablet by mouth two (2) times daily (with meals).  180 Tablet 3     No Known Allergies    Family History   Problem Relation Age of Onset    Diabetes Father      Social History     Tobacco Use    Smoking status: Former     Types: Cigarettes    Smokeless tobacco: Never   Substance Use Topics    Alcohol use: Not Currently         Shashank Boggs

## 2023-01-30 NOTE — PROGRESS NOTES
Chief Complaint   Patient presents with    Follow-up       HPI:    Lucrecia Fournier is a 81 y.o. male who presents for chronic follow-up. T2DM:  Compliant with metformin and Amaryl; unable to afford other treatment options. Notes that home sugars have been up over the last couple of months, some in the 300s; he admits that he has been eating more sugars, started drinking sodas again, no longer wants to follow a diabetic diet. .  A1C 10.5-->8.9-->7.0-->11.2%. HF:  TTE performed in August 2022 for an episode of SOB showed LVEF 25% without discernible cause; follow-up PCI did not show any significant CAD. Repeat TTE in November showed slightly improved LVEF to 35%. Has daily CP not associated with any particular activity; started on Imdur in November without much change. Has occasional fleeting episodes of SOB, but overall feels this is improved. HLD:  Had an episode of rhabdo with simvastatin and unwilling to try other statins; cardiology ordered Zetia, but he only took this for a week because it made him feel \"weird\". He does not follow any particular diet. No Known Allergies    Current Outpatient Medications   Medication Sig    isosorbide mononitrate ER (IMDUR) 30 mg tablet Take 30 mg by mouth daily. carvediloL (COREG) 6.25 mg tablet Take 1 Tablet by mouth two (2) times a day. glimepiride (AMARYL) 4 mg tablet Take 1 Tablet by mouth two (2) times a day. cholecalciferol, vitamin D3, 50 mcg (2,000 unit) tab Take 2,000 Units by mouth daily. olmesartan (BENICAR) 5 mg tablet Take 2 Tablets by mouth daily. aspirin delayed-release 81 mg tablet Take 81 mg by mouth in the morning. spironolactone (ALDACTONE) 25 mg tablet Take 25 mg by mouth in the morning. albuterol (PROVENTIL HFA, VENTOLIN HFA, PROAIR HFA) 90 mcg/actuation inhaler Take 2 Puffs by inhalation every four (4) hours as needed for Wheezing. QUEtiapine (SEROquel) 300 mg tablet Take 2 Tablets by mouth nightly.     metFORMIN (GLUCOPHAGE) 1,000 mg tablet Take 1 Tablet by mouth two (2) times daily (with meals). magnesium oxide (MAG-OX) 400 mg tablet Take 400 mg by mouth daily. No current facility-administered medications for this visit. Past Medical History:   Diagnosis Date    Diabetes ([address]        Family History   Problem Relation Age of Onset    Diabetes Father        Review of Systems   Constitutional: Negative. Negative for chills, fever and malaise/fatigue. HENT: Negative. Eyes: Negative. Respiratory:  Positive for shortness of breath. Negative for cough. Cardiovascular:  Positive for chest pain. Negative for palpitations and leg swelling. Gastrointestinal: Negative. Negative for abdominal pain, nausea and vomiting. Genitourinary: Negative. Musculoskeletal: Negative. Skin: Negative. Neurological: Negative. Negative for dizziness and headaches. Endo/Heme/Allergies: Negative. Psychiatric/Behavioral: Negative. Negative for depression. The patient is not nervous/anxious.        /60 (BP 1 Location: Left upper arm, BP Patient Position: Sitting, BP Cuff Size: Large adult)   Pulse 81   Temp 98.3 °F (36.8 °C) (Oral)   Resp 20   Ht 6' 3\" (1.905 m)   Wt 223 lb 6.4 oz (101.3 kg)   SpO2 96%   BMI 27.92 kg/m²     Wt Readings from Last 3 Encounters:   01/30/23 223 lb 6.4 oz (101.3 kg)   10/28/22 216 lb (98 kg)   08/10/22 217 lb 3.2 oz (98.5 kg)       3 most recent PHQ Screens 1/30/2023   Little interest or pleasure in doing things Not at all   Feeling down, depressed, irritable, or hopeless Not at all   Total Score PHQ 2 0   Trouble falling or staying asleep, or sleeping too much Not at all   Feeling tired or having little energy Not at all   Poor appetite, weight loss, or overeating Not at all   Feeling bad about yourself - or that you are a failure or have let yourself or your family down Not at all   Trouble concentrating on things such as school, work, reading, or watching TV Not at all   Moving or speaking so slowly that other people could have noticed; or the opposite being so fidgety that others notice Not at all   Thoughts of being better off dead, or hurting yourself in some way Not at all   PHQ 9 Score 0       Physical Exam  Vitals and nursing note reviewed. Constitutional:       General: He is not in acute distress. Appearance: Normal appearance. HENT:      Head: Normocephalic and atraumatic. Mouth/Throat:      Mouth: Mucous membranes are moist.      Pharynx: Oropharynx is clear. Eyes:      Extraocular Movements: Extraocular movements intact. Conjunctiva/sclera: Conjunctivae normal.      Pupils: Pupils are equal, round, and reactive to light. Neck:      Vascular: No carotid bruit. Cardiovascular:      Rate and Rhythm: Normal rate and regular rhythm. Pulses: Normal pulses. Heart sounds: Normal heart sounds. No murmur heard. No friction rub. No gallop. Pulmonary:      Effort: Pulmonary effort is normal.      Breath sounds: Normal breath sounds. No wheezing, rhonchi or rales. Abdominal:      General: Bowel sounds are normal.      Palpations: Abdomen is soft. Musculoskeletal:         General: Normal range of motion. Cervical back: Normal range of motion and neck supple. Right lower leg: No edema. Left lower leg: No edema. Lymphadenopathy:      Cervical: No cervical adenopathy. Skin:     General: Skin is warm and dry. Neurological:      General: No focal deficit present. Mental Status: He is alert and oriented to person, place, and time. Psychiatric:         Mood and Affect: Mood normal.         Behavior: Behavior normal.         Thought Content: Thought content normal.         Judgment: Judgment normal.       ASSESSMENT AND PLAN:       ICD-10-CM ICD-9-CM    1. Medicare annual wellness visit, subsequent  Z00.00 V70.0       2.  Primary hypertension  I10 401.9       3. HFrEF (heart failure with reduced ejection fraction) ([facility]  I50.20 428.20 [place] COLLECTION VENOUS BLOOD VENIPUNCTURE      TSH 3RD GENERATION      TSH 3RD GENERATION      carvediloL (COREG) 6.25 mg tablet      4. Type 2 diabetes mellitus with hyperglycemia, without long-term current use of insulin (HCC)  E11.65 250.00 [place] COLLECTION VENOUS BLOOD VENIPUNCTURE     842.58 METABOLIC PANEL, COMPREHENSIVE      AMB POC HEMOGLOBIN A1C      TSH 3RD GENERATION      TSH 3RD GENERATION      METABOLIC PANEL, COMPREHENSIVE      glimepiride (AMARYL) 4 mg tablet      5. Mixed hyperlipidemia  E78.2 272.2       6. Mixed bipolar I disorder in remission ([facility]  F31.70 296.66       7. Encounter for immunization  Z23 V03.89 [place] IMMUNIZ ADMIN,1 SINGLE/COMB VAC/TOXOID      PNEUMOCOCCAL, PCV20, PREVNAR 20, (AGE 18 YRS+), IM, PF      8. Noncompliance with diet and medication regimen  Z91.119 V15.81     Z91.14            Lala Hopson discussion regarding effects dietary and medication noncompliance; he verbalizes understanding of risks. A1C 10.8% today; increase glimepiride for improved glycemic control. Normotensive; continue current regimen without changes. Medication Side Effects and Warnings were discussed with patient:  yes  Patient Labs were reviewed:  yes  Patient Past Records were reviewed:  yes      Patient aware of plan of care and verbalized understanding. Questions answered. RTC 3 months or sooner if needed. On this date 01/30/2023 I have spent 30 minutes reviewing previous notes, test results and face to face with the patient discussing the diagnosis and importance of compliance with the treatment plan as well as documenting on the day of the visit.     Usha Cadena NP

## 2023-01-30 NOTE — PATIENT INSTRUCTIONS
Medicare Wellness Visit, Male    The best way to live healthy is to have a lifestyle where you eat a well-balanced diet, exercise regularly, limit alcohol use, and quit all forms of tobacco/nicotine, if applicable. Regular preventive services are another way to keep healthy. Preventive services (vaccines, screening tests, monitoring & exams) can help personalize your care plan, which helps you manage your own care. Screening tests can find health problems at the earliest stages, when they are easiest to treat. [facility] follows the current, evidence-based guidelines published by the [facility] ([facility]) when recommending preventive services for our patients. Because we follow these guidelines, sometimes recommendations change over time as research supports it. (For example, a prostate screening blood test is no longer routinely recommended for men with no symptoms). Of course, you and your doctor may decide to screen more often for some diseases, based on your risk and co-morbidities (chronic disease you are already diagnosed with). Preventive services for you include:  - Medicare offers their members a free annual wellness visit, which is time for you and your primary care provider to discuss and plan for your preventive service needs.  Take advantage of this benefit every year!    -Over the age of 72 should receive the recommended pneumonia vaccines.   -All adults should have a flu vaccine yearly.  -All adults should receive a tetanus vaccine every 10 years.  -Over the age of 48 should receive the shingles vaccines.    -All adults should be screened once for Hepatitis C.  -All adults age 38-68 who are overweight should have a diabetes screening test once every three years.   -Other screening tests & preventive services for persons with diabetes include: an eye exam to screen for diabetic retinopathy, a kidney function test, a foot exam, and stricter control over your cholesterol.   -Cardiovascular screening for adults with routine risk involves an electrocardiogram (ECG) at intervals determined by the provider.     -Colorectal cancer screening should be done for adults age 43-69 with no increased risk factors for colorectal cancer. There are a number of acceptable methods of screening for this type of cancer. Each test has its own benefits and drawbacks. Discuss with your provider what is most appropriate for you during your annual wellness visit. The different tests include: colonoscopy (considered the best screening method), a fecal occult blood test, a fecal DNA test, and sigmoidoscopy.    -Lung cancer screening is recommended annually with a low dose CT scan for adults between age 54 and 68, who have smoked at least 30 pack years (equivalent of 1 pack per day for 30 days), and who is a current smoker or quit less than 15 years ago. -An Abdominal Aortic Aneurysm (AAA) Screening is recommended for men age 73-68 who has ever smoked in their lifetime.      Here is a list of your current Health Maintenance items (your personalized list of preventive services) with a due date:  Health Maintenance Due   Topic Date Due    Pneumococcal Vaccine (1 - PCV) Never done    Eye Exam  Never done    DTaP/Tdap/Td  (1 - Tdap) Never done    Shingles Vaccine (1 of 2) Never done    COVID-19 Vaccine (3 - Booster for [employer] series) 06/28/2022    Annual Well Visit  01/22/2023

## 2023-01-30 NOTE — ACP (ADVANCE CARE PLANNING)
Discussed importance of advanced medical directives with patient. Patient is capable of making decisions. Discussed advanced directives 1/30/2023. [place] advance directive form discussed with pt. Pt will consider options and give us written form back for inclusion in chart.     Stanislaw Coburn NP

## 2023-01-31 LAB
ALBUMIN SERPL-MCNC: 3.9 G/DL (ref 3.5–5)
ALBUMIN/GLOB SERPL: 1.3 (ref 1.1–2.2)
ALP SERPL-CCNC: 57 U/L (ref 45–117)
ALT SERPL-CCNC: 59 U/L (ref 12–78)
ANION GAP SERPL CALC-SCNC: 7 MMOL/L (ref 5–15)
AST SERPL-CCNC: 32 U/L (ref 15–37)
BILIRUB SERPL-MCNC: 0.5 MG/DL (ref 0.2–1)
BUN SERPL-MCNC: 22 MG/DL (ref 6–20)
BUN/CREAT SERPL: 15 (ref 12–20)
CALCIUM SERPL-MCNC: 9.6 MG/DL (ref 8.5–10.1)
CHLORIDE SERPL-SCNC: 100 MMOL/L (ref 97–108)
CO2 SERPL-SCNC: 27 MMOL/L (ref 21–32)
COMMENT, HOLDF: NORMAL
CREAT SERPL-MCNC: 1.48 MG/DL (ref 0.7–1.3)
GLOBULIN SER CALC-MCNC: 3.1 G/DL (ref 2–4)
GLUCOSE SERPL-MCNC: 345 MG/DL (ref 65–100)
POTASSIUM SERPL-SCNC: 4.4 MMOL/L (ref 3.5–5.1)
PROT SERPL-MCNC: 7 G/DL (ref 6.4–8.2)
SAMPLES BEING HELD,HOLD: NORMAL
SODIUM SERPL-SCNC: 134 MMOL/L (ref 136–145)
TSH SERPL DL<=0.05 MIU/L-ACNC: 3.76 UIU/ML (ref 0.36–3.74)

## 2023-02-01 NOTE — PROGRESS NOTES
Your kidney function has declined and I am concerned that your uncontrolled diabetes is contributing to this; work on following a diabetic diet to help get this under control.

## 2023-03-01 DIAGNOSIS — E11.65 TYPE 2 DIABETES MELLITUS WITH HYPERGLYCEMIA, WITHOUT LONG-TERM CURRENT USE OF INSULIN (HCC): ICD-10-CM

## 2023-03-01 RX ORDER — METFORMIN HYDROCHLORIDE 1000 MG/1
TABLET ORAL
Qty: 180 TABLET | Refills: 0 | Status: SHIPPED | OUTPATIENT
Start: 2023-03-01

## 2023-04-03 DIAGNOSIS — F31.70 MIXED BIPOLAR I DISORDER IN REMISSION (HCC): ICD-10-CM

## 2023-04-03 RX ORDER — QUETIAPINE FUMARATE 300 MG/1
600 TABLET, FILM COATED ORAL
Qty: 180 TABLET | Refills: 0 | Status: SHIPPED | OUTPATIENT
Start: 2023-04-03

## 2024-03-24 LAB
LEFT VENTRICULAR EJECTION FRACTION HIGH VALUE: 40 %
LV EF: 35 %